# Patient Record
Sex: MALE | Race: WHITE | NOT HISPANIC OR LATINO | Employment: FULL TIME | URBAN - METROPOLITAN AREA
[De-identification: names, ages, dates, MRNs, and addresses within clinical notes are randomized per-mention and may not be internally consistent; named-entity substitution may affect disease eponyms.]

---

## 2017-03-15 NOTE — PROGRESS NOTES
SUBJECTIVE:     CC: Ceferino Crouch is an 51 year old male who presents for preventative health visit.     Healthy Habits:    Do you get at least three servings of calcium containing foods daily (dairy, green leafy vegetables, etc.)? Yes, cut back on     Amount of exercise or daily activities, outside of work: physical active walking dogs    Problems taking medications regularly No    Medication side effects: No    Have you had an eye exam in the past two years? yes    Do you see a dentist twice per year? yes    Do you have sleep apnea, excessive snoring or daytime drowsiness?no    Concerns:  1. Varicose Veins: Patient states he has some swelling in his right lower leg but it has been that way for awhile now.   2. Asthma:  3. Mini stroke after Thanks Giving: ER could not find exact cause after extensive work up. Took an ASA.    Today's PHQ-2 Score:   PHQ-2 ( 1999 Pfizer) 3/21/2017 3/10/2016   Q1: Little interest or pleasure in doing things 0 0   Q2: Feeling down, depressed or hopeless 0 0   PHQ-2 Score 0 0       Abuse: Current or Past(Physical, Sexual or Emotional)- No  Do you feel safe in your environment - Yes    Social History   Substance Use Topics     Smoking status: Never Smoker     Smokeless tobacco: Never Used      Comment: Lives in smoke free household     Alcohol use Yes      Comment: Social drinks occasionally.     The patient does not drink >3 drinks per day nor >7 drinks per week.    Last PSA:   PSA   Date Value Ref Range Status   03/10/2016 0.90 0 - 4 ug/L Final       Recent Labs   Lab Test  03/10/16   0952  02/02/15   0958  05/06/13   1117   CHOL  156  168  196   HDL  59  66  49   LDL  87  91  127   TRIG  51  54  103   CHOLHDLRATIO   --   2.5  4.0   NHDL  97   --    --      Asthma Follow-Up   He was in [facility] 2 weeks ago and was treated for pneumonia and was using the inhaler up to 3 times a day.  At this time back to normal.    Was ACT completed today?    Yes    ACT Total Scores  "3/21/2017   ACT TOTAL SCORE -   ASTHMA ER VISITS -   ASTHMA HOSPITALIZATIONS -   ACT TOTAL SCORE (Goal Greater than or Equal to 20) 11   In the past 12 months, how many times did you visit the emergency room for your asthma without being admitted to the hospital? 0   In the past 12 months, how many times were you hospitalized overnight because of your asthma? 0       Recent asthma triggers that patient is dealing with: upper respiratory infections      Reviewed orders with patient. Reviewed health maintenance and updated orders accordingly - Yes    Reviewed and updated as needed this visit by clinical staff  Tobacco  Allergies  Meds  Med Hx  Surg Hx  Fam Hx  Soc Hx        Reviewed and updated as needed this visit by Provider            ROS:  C: NEGATIVE for fever, chills, change in weight  I: NEGATIVE for worrisome rashes, moles or lesions  E: NEGATIVE for vision changes or irritation  ENT: NEGATIVE for ear, mouth and throat problems  R: NEGATIVE for significant cough or SOB  CV: NEGATIVE for chest pain, palpitations or peripheral edema  GI: NEGATIVE for nausea, abdominal pain, heartburn, or change in bowel habits   male: negative for dysuria, hematuria, decreased urinary stream, erectile dysfunction, urethral discharge  M: NEGATIVE for significant arthralgias or myalgia  N: NEGATIVE for weakness, dizziness or paresthesias  P: NEGATIVE for changes in mood or affect  EXTREMITY: RT Leg Varicose veins    Problem list, Medication list, Allergies, and Medical/Social/Surgical histories reviewed in EPIC and updated as appropriate.  OBJECTIVE:     /70  Pulse 65  Temp 97.1  F (36.2  C) (Oral)  Ht 6' 0.25\" (1.835 m)  Wt 191 lb (86.6 kg)  SpO2 98%  BMI 25.73 kg/m2  EXAM:  GENERAL: healthy, alert and no distress  EYES: Eyes grossly normal to inspection, PERRL and conjunctivae and sclerae normal  HENT: ear canals and TM's normal, nose and mouth without ulcers or lesions  NECK: no adenopathy, no asymmetry, masses, " "or scars and thyroid normal to palpation  RESP: lungs clear to auscultation - no rales, rhonchi or wheezes  CV: regular rate and rhythm, normal S1 S2, no S3 or S4, no murmur, click or rub, no peripheral edema and peripheral pulses strong  ABDOMEN: soft, nontender, no hepatosplenomegaly, no masses and bowel sounds normal  MS: no gross musculoskeletal defects noted, no edema  SKIN: no suspicious lesions or rashes  NEURO: Normal strength and tone, mentation intact and speech normal  PSYCH: mentation appears normal, affect normal/bright    ASSESSMENT/PLAN:     Ceferino was seen today for physical.    Diagnoses and all orders for this visit:    Encounter for routine adult medical exam with abnormal findings  -     Glucose    Varicose veins of leg with pain, right  -     VASCULAR SURGERY REFERRAL    Need for hepatitis C screening test  -     Hepatitis C Screen Reflex to HCV RNA Quant and Genotype    Mild persistent asthma without complication  -     mometasone (ASMANEX 30 METERED DOSES) 220 MCG/INH Inhaler; Inhale 2 puffs into the lungs daily  -     albuterol (PROAIR HFA/PROVENTIL HFA/VENTOLIN HFA) 108 (90 BASE) MCG/ACT Inhaler; Inhale 2 puffs into the lungs every 6 hours as needed for shortness of breath / dyspnea    Hyperlipidemia with target LDL less than 160  -     Lipid panel reflex to direct LDL    Screening for prostate cancer  -     Prostate spec antigen screen    H/O Mini stroke (H)     Resolved     On ASA      COUNSELING:  Reviewed preventive health counseling, as reflected in patient instructions       Regular exercise       Healthy diet/nutrition       Consider Hep C screening for patients born between 1945 and 1965       Prostate cancer screening       reports that he has never smoked. He has never used smokeless tobacco.    Estimated body mass index is 25.73 kg/(m^2) as calculated from the following:    Height as of this encounter: 6' 0.25\" (1.835 m).    Weight as of this encounter: 191 lb (86.6 kg).   Weight " management plan: Discussed healthy diet and exercise guidelines and patient will follow up in 12 months in clinic to re-evaluate.    Counseling Resources:  ATP IV Guidelines  Pooled Cohorts Equation Calculator  FRAX Risk Assessment  ICSI Preventive Guidelines  Dietary Guidelines for Americans, 2010  USDA's MyPlate  ASA Prophylaxis  Lung CA Screening    Shady Ruggiero MD  [facility]

## 2017-03-15 NOTE — PATIENT INSTRUCTIONS
Preventive Health Recommendations  Male Ages 50   64    Yearly exam:             See your health care provider every year in order to  o   Review health changes.   o   Discuss preventive care.    o   Review your medicines if your doctor has prescribed any.     Have a cholesterol test every 5 years, or more frequently if you are at risk for high cholesterol/heart disease.     Have a diabetes test (fasting glucose) every three years. If you are at risk for diabetes, you should have this test more often.     Have a colonoscopy at age 50, or have a yearly FIT test (stool test). These exams will check for colon cancer.      Talk with your health care provider about whether or not a prostate cancer screening test (PSA) is right for you.    You should be tested each year for STDs (sexually transmitted diseases), if you re at risk.     Shots: Get a flu shot each year. Get a tetanus shot every 10 years.     Nutrition:    Eat at least 5 servings of fruits and vegetables daily.     Eat whole-grain bread, whole-wheat pasta and brown rice instead of white grains and rice.     Talk to your provider about Calcium and Vitamin D.     Lifestyle    Exercise for at least 150 minutes a week (30 minutes a day, 5 days a week). This will help you control your weight and prevent disease.     Limit alcohol to one drink per day.     No smoking.     Wear sunscreen to prevent skin cancer.     See your dentist every six months for an exam and cleaning.     See your eye doctor every 1 to 2 years.    [facility]    If you have any questions regarding to your visit please contact your care team:       Team Purple:   Clinic Hours Telephone Number   [address] Dr[address]   7am-7pm  Monday - Thursday   7am-5pm  Fridays  [phone]  (Appointment scheduling available 24/7)    Questions about your Visit?   Team Line:  [phone]   Urgent Care - [place] and [place]  [place] - 11am-9pm Monday-Friday Saturday-Sunday- 9am-5pm   [place] - 5pm-9pm Monday-Friday Saturday-Sunday- 9am-5pm  [phone] - [place]   [phone] - [place]       What options do I have for visits at the clinic other than the traditional office visit?  To expand how we care for you, many of our providers are utilizing electronic visits (e-visits) and telephone visits, when medically appropriate, for interactions with their patients rather than a visit in the clinic.   We also offer nurse visits for many medical concerns. Just like any other service, we will bill your insurance company for this type of visit based on time spent on the phone with your provider. Not all insurance companies cover these visits. Please check with your medical insurance if this type of visit is covered. You will be responsible for any charges that are not paid by your insurance.      E-visits via [employer]:  generally incur a $35.00 fee.  Telephone visits:  Time spent on the phone: *charged based on time that is spent on the phone in increments of 10 minutes. Estimated cost:   5-10 mins $30.00   11-20 mins. $59.00   21-30 mins. $85.00     Use [employer] (secure email communication and access to your chart) to send your primary care provider a message or make an appointment. Ask someone on your Team how to sign up for [employer].  For a Price Quote for your services, please call our Consumer Price Line at [phone].  As always, Thank you for trusting us with your health care needs!    Discharged by Michelle Heredia CMA

## 2017-03-21 ENCOUNTER — OFFICE VISIT (OUTPATIENT)
Dept: FAMILY MEDICINE | Facility: CLINIC | Age: 52
End: 2017-03-21
Payer: COMMERCIAL

## 2017-03-21 VITALS
WEIGHT: 191 LBS | TEMPERATURE: 97.1 F | SYSTOLIC BLOOD PRESSURE: 110 MMHG | BODY MASS INDEX: 25.87 KG/M2 | DIASTOLIC BLOOD PRESSURE: 70 MMHG | OXYGEN SATURATION: 98 % | HEIGHT: 72 IN | HEART RATE: 65 BPM

## 2017-03-21 DIAGNOSIS — I83.811 VARICOSE VEINS OF LEG WITH PAIN, RIGHT: ICD-10-CM

## 2017-03-21 DIAGNOSIS — G45.9 MINI STROKE: ICD-10-CM

## 2017-03-21 DIAGNOSIS — Z11.59 NEED FOR HEPATITIS C SCREENING TEST: ICD-10-CM

## 2017-03-21 DIAGNOSIS — E78.5 HYPERLIPIDEMIA WITH TARGET LDL LESS THAN 160: ICD-10-CM

## 2017-03-21 DIAGNOSIS — Z00.01 ENCOUNTER FOR ROUTINE ADULT MEDICAL EXAM WITH ABNORMAL FINDINGS: Primary | ICD-10-CM

## 2017-03-21 DIAGNOSIS — Z12.5 SCREENING FOR PROSTATE CANCER: ICD-10-CM

## 2017-03-21 DIAGNOSIS — J45.30 MILD PERSISTENT ASTHMA WITHOUT COMPLICATION: ICD-10-CM

## 2017-03-21 LAB
CHOLEST SERPL-MCNC: 167 MG/DL
GLUCOSE SERPL-MCNC: 95 MG/DL (ref 70–99)
HCV AB SERPL QL IA: NORMAL
HDLC SERPL-MCNC: 72 MG/DL
LDLC SERPL CALC-MCNC: 85 MG/DL
NONHDLC SERPL-MCNC: 95 MG/DL
PSA SERPL-ACNC: 0.95 UG/L (ref 0–4)
TRIGL SERPL-MCNC: 52 MG/DL

## 2017-03-21 PROCEDURE — G0103 PSA SCREENING: HCPCS | Performed by: FAMILY MEDICINE

## 2017-03-21 PROCEDURE — 99396 PREV VISIT EST AGE 40-64: CPT | Performed by: FAMILY MEDICINE

## 2017-03-21 PROCEDURE — 80061 LIPID PANEL: CPT | Performed by: FAMILY MEDICINE

## 2017-03-21 PROCEDURE — 86803 HEPATITIS C AB TEST: CPT | Performed by: FAMILY MEDICINE

## 2017-03-21 PROCEDURE — 82947 ASSAY GLUCOSE BLOOD QUANT: CPT | Performed by: FAMILY MEDICINE

## 2017-03-21 PROCEDURE — 36415 COLL VENOUS BLD VENIPUNCTURE: CPT | Performed by: FAMILY MEDICINE

## 2017-03-21 RX ORDER — ALBUTEROL SULFATE 90 UG/1
2 AEROSOL, METERED RESPIRATORY (INHALATION) EVERY 6 HOURS PRN
Qty: 3 INHALER | Refills: 3 | Status: SHIPPED | OUTPATIENT
Start: 2017-03-21 | End: 2018-03-27

## 2017-03-21 ASSESSMENT — PAIN SCALES - GENERAL: PAINLEVEL: NO PAIN (0)

## 2017-03-21 NOTE — LETTER
[address] [place] [address]    March 22, 2017    Ceferino Crouch  [address] [address] [place], [place] [address]LE [facility] [address]          Dear Ceferino,    Enclosed is a copy of your results. Normal results.     Results for orders placed or performed in visit on 03/21/17   Hepatitis C Screen Reflex to HCV RNA Quant and Genotype   Result Value Ref Range    Hepatitis C Antibody  NR     Nonreactive   Assay performance characteristics have not been established for newborns,   infants, and children     Lipid panel reflex to direct LDL   Result Value Ref Range    Cholesterol 167 <200 mg/dL    Triglycerides 52 <150 mg/dL    HDL Cholesterol 72 >39 mg/dL    LDL Cholesterol Calculated 85 <100 mg/dL    Non HDL Cholesterol 95 <130 mg/dL   Glucose   Result Value Ref Range    Glucose 95 70 - 99 mg/dL   Prostate spec antigen screen   Result Value Ref Range    PSA 0.95 0 - 4 ug/L       If you have any questions or concerns, please me or my clinic team at [phone].      Sincerely,        Shady Ruggiero MD/bt

## 2017-03-21 NOTE — MR AVS SNAPSHOT
After Visit Summary   3/21/2017    Ceferino Crouch    MRN: [id number]           Patient Information     Date Of Birth          1965        Visit Information        Provider Department      3/21/2017 9:20 AM Shady Ruggiero MD [facility]        Today's Diagnoses     Encounter for routine adult medical exam with abnormal findings    -  1    Varicose veins of leg with pain, right        Need for hepatitis C screening test        Mild persistent asthma without complication        Hyperlipidemia with target LDL less than 160        Screening for prostate cancer          Care Instructions      Preventive Health Recommendations  Male Ages 50 - 64    Yearly exam:             See your health care provider every year in order to  o   Review health changes.   o   Discuss preventive care.    o   Review your medicines if your doctor has prescribed any.     Have a cholesterol test every 5 years, or more frequently if you are at risk for high cholesterol/heart disease.     Have a diabetes test (fasting glucose) every three years. If you are at risk for diabetes, you should have this test more often.     Have a colonoscopy at age 50, or have a yearly FIT test (stool test). These exams will check for colon cancer.      Talk with your health care provider about whether or not a prostate cancer screening test (PSA) is right for you.    You should be tested each year for STDs (sexually transmitted diseases), if you re at risk.     Shots: Get a flu shot each year. Get a tetanus shot every 10 years.     Nutrition:    Eat at least 5 servings of fruits and vegetables daily.     Eat whole-grain bread, whole-wheat pasta and brown rice instead of white grains and rice.     Talk to your provider about Calcium and Vitamin D.     Lifestyle    Exercise for at least 150 minutes a week (30 minutes a day, 5 days a week). This will help you control your weight and prevent disease.     Limit alcohol to one  drink per day.     No smoking.     Wear sunscreen to prevent skin cancer.     See your dentist every six months for an exam and cleaning.     See your eye doctor every 1 to 2 years.    [facility]    If you have any questions regarding to your visit please contact your care team:       Team Purple:   Clinic Hours Telephone Number   [address] Dr.   7am-7pm  Monday - Thursday   7am-5pm  Fridays  [phone]  (Appointment scheduling available 24/7)    Questions about your Visit?   Team Line:  [phone]   Urgent Care - [place] and [place] - 11am-9pm Monday-Friday Saturday-Sunday- 9am-5pm   [place] - 5pm-9pm Monday-Friday Saturday-Sunday- 9am-5pm  [phone] - [place]   [phone] - [place]       What options do I have for visits at the clinic other than the traditional office visit?  To expand how we care for you, many of our providers are utilizing electronic visits (e-visits) and telephone visits, when medically appropriate, for interactions with their patients rather than a visit in the clinic.   We also offer nurse visits for many medical concerns. Just like any other service, we will bill your insurance company for this type of visit based on time spent on the phone with your provider. Not all insurance companies cover these visits. Please check with your medical insurance if this type of visit is covered. You will be responsible for any charges that are not paid by your insurance.      E-visits via [employer]:  generally incur a $35.00 fee.  Telephone visits:  Time spent on the phone: *charged based on time that is spent on the phone in increments of 10 minutes. Estimated cost:   5-10 mins $30.00   11-20 mins. $59.00   21-30 mins. $85.00     Use [employer] (secure email communication and access to your chart) to send your primary care provider a message or make an appointment. Ask someone on your Team how to  sign up for [employer].  For a Price Quote for your services, please call our Consumer Price Line at [phone].  As always, Thank you for trusting us with your health care needs!    Discharged by Michelle Heredia CMA          Follow-ups after your visit        Additional Services     VASCULAR SURGERY REFERRAL       Your provider has referred you to: **[facility] [phone] - Varicose Veins & None - Please Order Appropriate Testing   [email or web address]    Please be aware that coverage of these services is subject to the terms and limitations of your health insurance plan.  Call member services at your health plan with any benefit or coverage questions.      Please bring the following with you to your appointment:    (1) Any X-Rays, CTs or MRIs which have been performed.  Contact the facility where they were done to arrange for  prior to your scheduled appointment.    (2) List of current medications   (3) This referral request   (4) Any documents/labs given to you for this referral                  Who to contact     If you have questions or need follow up information about today's clinic visit or your schedule please contact [facility] directly at [phone].  Normal or non-critical lab and imaging results will be communicated to you by MyChart, letter or phone within 4 business days after the clinic has received the results. If you do not hear from us within 7 days, please contact the clinic through MyChart or phone. If you have a critical or abnormal lab result, we will notify you by phone as soon as possible.  Submit refill requests through [employer] or call your pharmacy and they will forward the refill request to us. Please allow 3 business days for your refill to be completed.          Additional Information About Your Visit        [employer] Information     [employer] lets you send messages to your doctor, view your test results, renew your  "prescriptions, schedule appointments and more. To sign up, go to [email or web address] . Click on \"Log in\" on the left side of the screen, which will take you to the Welcome page. Then click on \"Sign up Now\" on the right side of the page.     You will be asked to enter the access code listed below, as well as some personal information. Please follow the directions to create your username and password.     Your access code is: [email or web address]  Expires: 2017  9:53 AM     Your access code will  in 90 days. If you need help or a new code, please call your [facility] clinic or [phone].        Care EveryWhere ID     This is your Care EveryWhere ID. This could be used by other organizations to access your [facility] medical records  [id number]        Your Vitals Were     Pulse Temperature Height Pulse Oximetry BMI (Body Mass Index)       65 97.1  F (36.2  C) (Oral) 6' 0.25\" (1.835 m) 98% 25.73 kg/m2        Blood Pressure from Last 3 Encounters:   17 110/70   16 102/68   03/10/16 110/60    Weight from Last 3 Encounters:   17 191 lb (86.6 kg)   16 212 lb (96.2 kg)   03/10/16 202 lb 3.2 oz (91.7 kg)              We Performed the Following     Asthma Action Plan   (Please complete E-AAP by signing order and opening link in order details)     Glucose     Hepatitis C Screen Reflex to HCV RNA Quant and Genotype     Lipid panel reflex to direct LDL     Prostate spec antigen screen     VASCULAR SURGERY REFERRAL          Where to get your medicines      Some of these will need a paper prescription and others can be bought over the counter.  Ask your nurse if you have questions.     Bring a paper prescription for each of these medications     albuterol 108 (90 BASE) MCG/ACT Inhaler    mometasone 220 MCG/INH Inhaler          Primary Care Provider Office Phone # Fax #    Felipe Pollock PA-C [phone]       [address] [address]      "   Thank you!     Thank you for choosing [facility]  for your care. Our goal is always to provide you with excellent care. Hearing back from our patients is one way we can continue to improve our services. Please take a few minutes to complete the written survey that you may receive in the mail after your visit with us. Thank you!             Your Updated Medication List - Protect others around you: Learn how to safely use, store and throw away your medicines at www.disposemymeds.org.          This list is accurate as of: 3/21/17  9:53 AM.  Always use your most recent med list.                   Brand Name Dispense Instructions for use    albuterol 108 (90 BASE) MCG/ACT Inhaler    PROAIR HFA/PROVENTIL HFA/VENTOLIN HFA    3 Inhaler    Inhale 2 puffs into the lungs every 6 hours as needed for shortness of breath / dyspnea       mometasone 220 MCG/INH Inhaler    ASMANEX 30 METERED DOSES    2 Inhaler    Inhale 2 puffs into the lungs daily

## 2017-03-21 NOTE — NURSING NOTE
"Chief Complaint   Patient presents with     Physical       Initial /70  Pulse 65  Temp 97.1  F (36.2  C) (Oral)  Ht 6' 0.25\" (1.835 m)  Wt 191 lb (86.6 kg)  SpO2 98%  BMI 25.73 kg/m2 Estimated body mass index is 25.73 kg/(m^2) as calculated from the following:    Height as of this encounter: 6' 0.25\" (1.835 m).    Weight as of this encounter: 191 lb (86.6 kg).  Medication Reconciliation: complete     Michelle Heredia CMA    "

## 2017-03-22 ASSESSMENT — ASTHMA QUESTIONNAIRES: ACT_TOTALSCORE: 11

## 2017-03-31 ENCOUNTER — TELEPHONE (OUTPATIENT)
Dept: FAMILY MEDICINE | Facility: CLINIC | Age: 52
End: 2017-03-31

## 2017-03-31 NOTE — LETTER
[facility]  [address] [place] [id number]  [phone]    April 12, 2017      Ceferino Crouch  [address] [address] [place] [address]      Dear Ceferino,     Your clinic record indicates that you are due for an asthma update. We have a survey tool called an ACT (or Asthma Control Test) we use to measure the level of control of your asthma. Please complete the enclosed questionnaire and mail it back to us in the self-addressed stamped envelope.     If you have questions about this letter please contact your provider.     Sincerely,      Your [facility]

## 2017-03-31 NOTE — TELEPHONE ENCOUNTER
Patient was in to see Monik on 03-21-17 and failed their ACT by scoring 11. Please put in the failed ACT workflow for follow-up. Thank you.

## 2017-04-17 ENCOUNTER — TELEPHONE (OUTPATIENT)
Dept: FAMILY MEDICINE | Facility: CLINIC | Age: 52
End: 2017-04-17

## 2017-04-17 DIAGNOSIS — J45.30 MILD PERSISTENT ASTHMA WITHOUT COMPLICATION: Primary | ICD-10-CM

## 2017-04-17 DIAGNOSIS — J45.30 ASTHMA, MILD PERSISTENT: ICD-10-CM

## 2017-04-17 NOTE — TELEPHONE ENCOUNTER
Patient requesting a call back to discuss dosage for inhaler as he thought the dosage was different.    Please advise.    Thank you.    Danielle RAYO

## 2017-04-17 NOTE — TELEPHONE ENCOUNTER
Patient notified of providers message as written.   Patient verbalized understanding and has no further questions or concerns.    Yasmin Garcia RN - BC

## 2017-04-17 NOTE — TELEPHONE ENCOUNTER
Spoke with patient his Asmanex was ordered for 2 puffs daily but it was ordered for the 30 dose not the 60, so patient only received a 15 day supply in 1 inhaler. Patient would like this changed to the 60 dose inhaler.  Patient would like call once prescription is sent in.   Order pending in   Please advise   Jodee Owen RN

## 2017-04-25 NOTE — TELEPHONE ENCOUNTER
Left a message for Ceferino to in regarding to the letter that was sent a few weeks ago with a test enclosed. Asked if he would please fill out and send back the test in the pre-paid envelop. Asked him to call the clinic with questions. Manju Rodriguez,

## 2017-04-27 ENCOUNTER — APPOINTMENT (OUTPATIENT)
Dept: VASCULAR SURGERY | Facility: CLINIC | Age: 52
End: 2017-04-27
Payer: COMMERCIAL

## 2017-04-27 PROCEDURE — 99207 ZZC VEINSOLUTIONS FREE SCREENING: CPT | Performed by: SURGERY

## 2017-05-02 DIAGNOSIS — E78.5 HYPERLIPIDEMIA WITH TARGET LDL LESS THAN 160: Primary | ICD-10-CM

## 2017-08-08 ENCOUNTER — TELEPHONE (OUTPATIENT)
Dept: FAMILY MEDICINE | Facility: CLINIC | Age: 52
End: 2017-08-08

## 2017-08-08 NOTE — TELEPHONE ENCOUNTER
Panel Management Review      Patient has the following on his problem list:     Asthma review     ACT Total Scores 3/21/2017   ACT TOTAL SCORE -   ASTHMA ER VISITS -   ASTHMA HOSPITALIZATIONS -   ACT TOTAL SCORE (Goal Greater than or Equal to 20) 11   In the past 12 months, how many times did you visit the emergency room for your asthma without being admitted to the hospital? 0   In the past 12 months, how many times were you hospitalized overnight because of your asthma? 0      1. Is Asthma diagnosis on the Problem List? Yes    2. Is Asthma listed on Health Maintenance? Yes    3. Patient is due for:  Failing ACT      IVD   ASA: Passed    Last LDL:    Lab Results   Component Value Date    CHOL 167 03/21/2017     Lab Results   Component Value Date    HDL 72 03/21/2017     Lab Results   Component Value Date    LDL 85 03/21/2017     Lab Results   Component Value Date    TRIG 52 03/21/2017        Lab Results   Component Value Date    CHOLHDLRATIO 2.5 02/02/2015        Is the patient on a Statin? NO   Is the patient on Aspirin? YES                  Medications     Salicylates    aspirin 81 MG tablet          Last three blood pressure readings:  BP Readings from Last 3 Encounters:   03/21/17 110/70   05/12/16 102/68   03/10/16 110/60        Tobacco History:     History   Smoking Status     Never Smoker   Smokeless Tobacco     Never Used     Comment: Lives in smoke free household             Composite cancer screening  Chart review shows that this patient is due/due soon for the following None  Summary:    Patient is due/failing the following:   Failing ACT and STATIN    Action needed:   Routed to provider for review., Patient needs to do ACT    Type of outreach:    Copy of ACT mailed to patient, will reach out in 5 days.    Questions for provider review:    Patient is not on a statin for IVD                                                                                                                                      An SCHUYLER [place]       Chart routed to Care Team .

## 2017-08-08 NOTE — LETTER
August 8, 2017      Ceferino Crouch  [address]  [facility] [address]      Dear Ceferino,     Your clinic record indicates that you are due for an asthma update. We have a survey tool called an ACT (or Asthma Control Test) we use to measure the level of control of your asthma. Please complete the enclosed questionnaire and mail it back to us in the self-addressed stamped envelope.     If you have questions about this letter please contact your provider.     Sincerely,    Your [facility]

## 2017-12-12 ENCOUNTER — TELEPHONE (OUTPATIENT)
Dept: FAMILY MEDICINE | Facility: CLINIC | Age: 52
End: 2017-12-12

## 2017-12-12 NOTE — TELEPHONE ENCOUNTER
Panel Management Review      Patient has the following on his problem list:     Asthma review     ACT Total Scores 3/21/2017   ACT TOTAL SCORE -   ASTHMA ER VISITS -   ASTHMA HOSPITALIZATIONS -   ACT TOTAL SCORE (Goal Greater than or Equal to 20) 11   In the past 12 months, how many times did you visit the emergency room for your asthma without being admitted to the hospital? 0   In the past 12 months, how many times were you hospitalized overnight because of your asthma? 0      1. Is Asthma diagnosis on the Problem List? Yes    2. Is Asthma listed on Health Maintenance? Yes    3. Patient is due for:  ACT      IVD   ASA: Passed    Last LDL:    Lab Results   Component Value Date    CHOL 167 03/21/2017     Lab Results   Component Value Date    HDL 72 03/21/2017     Lab Results   Component Value Date    LDL 85 03/21/2017     Lab Results   Component Value Date    TRIG 52 03/21/2017        Lab Results   Component Value Date    CHOLHDLRATIO 2.5 02/02/2015        Is the patient on a Statin? NO   Is the patient on Aspirin? YES                  Medications     Salicylates    aspirin 81 MG tablet          Last three blood pressure readings:  BP Readings from Last 3 Encounters:   03/21/17 110/70   05/12/16 102/68   03/10/16 110/60        Tobacco History:     History   Smoking Status     Never Smoker   Smokeless Tobacco     Never Used     Comment: Lives in smoke free household             Composite cancer screening  Chart review shows that this patient is due/due soon for the following None  Summary:    Patient is due/failing the following:   ACT and STATIN    Action needed:   Patient needs office visit for ACT and appointment for follow up asthma and IVD.    Type of outreach:    Routed to the team, Please reach out to Ceferino and get him scheduled for a follow up. Thank you    Questions for provider review:    None                                                                                                                                     LEONIDAS Grey       Chart routed to Care Team .

## 2018-01-25 ENCOUNTER — TELEPHONE (OUTPATIENT)
Dept: FAMILY MEDICINE | Facility: CLINIC | Age: 53
End: 2018-01-25

## 2018-01-25 DIAGNOSIS — J45.30 MILD PERSISTENT ASTHMA WITHOUT COMPLICATION: Primary | ICD-10-CM

## 2018-01-25 NOTE — LETTER
[facility]  [address] [place] [address]  [place] [address]  [phone]    January 26, 2018      Ceferino Crouch  [address] [facility] [address]      Dear Ceferino,     Your clinic record indicates that you are due for an asthma update. We have a survey tool called an ACT (or Asthma Control Test) we use to measure the level of control of your asthma. Please complete the enclosed questionnaire and mail it back to us in the self-addressed stamped envelope.     If you have questions about this letter please contact your provider.     Sincerely,    Your [facility]

## 2018-01-25 NOTE — TELEPHONE ENCOUNTER
Reason for Call:  Other prescription    Detailed comments: Patient states insurance does not cover Asmanex medication. He is requesting a refill authorization. [facility] pharmacy did send a fax as well. Patient only has enough for two more days.     Pharmacy: [facility]     Phone Number Patient can be reached at: Home number on file [phone] (home)    Best Time: any    Can we leave a detailed message on this number? YES    Call taken on 1/25/2018 at 9:22 AM by Mundo Gaytan

## 2018-01-25 NOTE — TELEPHONE ENCOUNTER
Received fax from pharmacy stating patient requires Prior Authorization for Asmanex     Insurance information:  Name: Hema   Phone number: [phone]   ID number: [id number]    Provider to address. Message route to Dr. Ruggiero. Initiate prior authorization or change medication?  Please advise.  Kira SALCEDO MA      **If a prior authorization is to be initiated, please list the following:    -Any medications the patient has tried and failed or any contraindications. **    -Is the patient currently on this medication, or has tried before? **    -What is the diagnosis? **    - Justification or other information that me by helpful. **      Called and spoke with patient. Informed patient of Asmanex medication will need PA or switch to a different medication. Patient is willing to switch to a different medication, if Dr. Ruggiero prefers.   Kira SALCEDO MA

## 2018-01-26 RX ORDER — FLUTICASONE PROPIONATE 220 UG/1
2 AEROSOL, METERED RESPIRATORY (INHALATION) 2 TIMES DAILY
Qty: 1 INHALER | Refills: 1 | Status: SHIPPED | OUTPATIENT
Start: 2018-01-26 | End: 2018-03-27

## 2018-01-26 NOTE — TELEPHONE ENCOUNTER
Called patient and advised him that a new medication was sent to the pharmacy. I also will be sending him an ACT and letter and he said he will send it back to us.     Lexy Thornton MA

## 2018-02-09 ASSESSMENT — ASTHMA QUESTIONNAIRES: ACT_TOTALSCORE: 20

## 2018-03-20 NOTE — PATIENT INSTRUCTIONS
Preventive Health Recommendations  Male Ages 50   64    Yearly exam:             See your health care provider every year in order to  o   Review health changes.   o   Discuss preventive care.    o   Review your medicines if your doctor has prescribed any.     Have a cholesterol test every 5 years, or more frequently if you are at risk for high cholesterol/heart disease.     Have a diabetes test (fasting glucose) every three years. If you are at risk for diabetes, you should have this test more often.     Have a colonoscopy at age 50, or have a yearly FIT test (stool test). These exams will check for colon cancer.      Talk with your health care provider about whether or not a prostate cancer screening test (PSA) is right for you.    You should be tested each year for STDs (sexually transmitted diseases), if you re at risk.     Shots: Get a flu shot each year. Get a tetanus shot every 10 years.     Nutrition:    Eat at least 5 servings of fruits and vegetables daily.     Eat whole-grain bread, whole-wheat pasta and brown rice instead of white grains and rice.     Talk to your provider about Calcium and Vitamin D.     Lifestyle    Exercise for at least 150 minutes a week (30 minutes a day, 5 days a week). This will help you control your weight and prevent disease.     Limit alcohol to one drink per day.     No smoking.     Wear sunscreen to prevent skin cancer.     See your dentist every six months for an exam and cleaning.     See your eye doctor every 1 to 2 years.  [facility]    If you have any questions regarding to your visit please contact your care team:       Team Purple:   Clinic Hours Telephone Number   Dr. Ann Marie Cagle   7am-7pm  Monday - Thursday   7am-5pm  Fridays  [phone]  (Appointment scheduling available 24/7)    Questions about your Visit?   Team Line:  [phone]   Urgent Care - [place] and [place]  [place] - 11am-9pm Monday-Friday Saturday-Sunday- 9am-5pm   [place] - 5pm-9pm Monday-Friday Saturday-Sunday- 9am-5pm  [phone] - [place]   [phone] - [place]       What options do I have for visits at the clinic other than the traditional office visit?  To expand how we care for you, many of our providers are utilizing electronic visits (e-visits) and telephone visits, when medically appropriate, for interactions with their patients rather than a visit in the clinic.   We also offer nurse visits for many medical concerns. Just like any other service, we will bill your insurance company for this type of visit based on time spent on the phone with your provider. Not all insurance companies cover these visits. Please check with your medical insurance if this type of visit is covered. You will be responsible for any charges that are not paid by your insurance.      E-visits via Mobbles:  generally incur a $35.00 fee.  Telephone visits:  Time spent on the phone: *charged based on time that is spent on the phone in increments of 10 minutes. Estimated cost:   5-10 mins $30.00   11-20 mins. $59.00   21-30 mins. $85.00     Use "[employer]"t (secure email communication and access to your chart) to send your primary care provider a message or make an appointment. Ask someone on your Team how to sign up for Mobbles.  For a Price Quote for your services, please call our Consumer Price Line at [phone].  As always, Thank you for trusting us with your health care needs!      Kira SALCEDO MA

## 2018-03-27 ENCOUNTER — OFFICE VISIT (OUTPATIENT)
Dept: FAMILY MEDICINE | Facility: CLINIC | Age: 53
End: 2018-03-27
Payer: COMMERCIAL

## 2018-03-27 VITALS
DIASTOLIC BLOOD PRESSURE: 68 MMHG | SYSTOLIC BLOOD PRESSURE: 104 MMHG | HEART RATE: 64 BPM | TEMPERATURE: 96.6 F | BODY MASS INDEX: 26.61 KG/M2 | HEIGHT: 72 IN | WEIGHT: 196.5 LBS | RESPIRATION RATE: 14 BRPM | OXYGEN SATURATION: 95 %

## 2018-03-27 DIAGNOSIS — J45.30 MILD PERSISTENT ASTHMA WITHOUT COMPLICATION: ICD-10-CM

## 2018-03-27 DIAGNOSIS — E78.5 HYPERLIPIDEMIA LDL GOAL <100: ICD-10-CM

## 2018-03-27 DIAGNOSIS — Z00.00 ROUTINE HISTORY AND PHYSICAL EXAMINATION OF ADULT: Primary | ICD-10-CM

## 2018-03-27 LAB
ANION GAP SERPL CALCULATED.3IONS-SCNC: 9 MMOL/L (ref 3–14)
BUN SERPL-MCNC: 14 MG/DL (ref 7–30)
CALCIUM SERPL-MCNC: 9.2 MG/DL (ref 8.5–10.1)
CHLORIDE SERPL-SCNC: 107 MMOL/L (ref 94–109)
CHOLEST SERPL-MCNC: 178 MG/DL
CO2 SERPL-SCNC: 24 MMOL/L (ref 20–32)
CREAT SERPL-MCNC: 0.9 MG/DL (ref 0.66–1.25)
GFR SERPL CREATININE-BSD FRML MDRD: 89 ML/MIN/1.7M2
GLUCOSE SERPL-MCNC: 91 MG/DL (ref 70–99)
HDLC SERPL-MCNC: 72 MG/DL
LDLC SERPL CALC-MCNC: 94 MG/DL
NONHDLC SERPL-MCNC: 106 MG/DL
POTASSIUM SERPL-SCNC: 4.7 MMOL/L (ref 3.4–5.3)
SODIUM SERPL-SCNC: 140 MMOL/L (ref 133–144)
TRIGL SERPL-MCNC: 61 MG/DL

## 2018-03-27 PROCEDURE — 99396 PREV VISIT EST AGE 40-64: CPT | Performed by: FAMILY MEDICINE

## 2018-03-27 PROCEDURE — 80048 BASIC METABOLIC PNL TOTAL CA: CPT | Performed by: FAMILY MEDICINE

## 2018-03-27 PROCEDURE — 36415 COLL VENOUS BLD VENIPUNCTURE: CPT | Performed by: FAMILY MEDICINE

## 2018-03-27 PROCEDURE — 80061 LIPID PANEL: CPT | Performed by: FAMILY MEDICINE

## 2018-03-27 RX ORDER — ALBUTEROL SULFATE 90 UG/1
2 AEROSOL, METERED RESPIRATORY (INHALATION) EVERY 6 HOURS PRN
Qty: 1 INHALER | Refills: 3 | Status: SHIPPED | OUTPATIENT
Start: 2018-03-27 | End: 2020-12-28

## 2018-03-27 RX ORDER — FLUTICASONE PROPIONATE 220 UG/1
2 AEROSOL, METERED RESPIRATORY (INHALATION) 2 TIMES DAILY
Qty: 3 INHALER | Refills: 1 | Status: SHIPPED | OUTPATIENT
Start: 2018-03-27 | End: 2019-04-26

## 2018-03-27 ASSESSMENT — PAIN SCALES - GENERAL: PAINLEVEL: NO PAIN (0)

## 2018-03-27 NOTE — NURSING NOTE
"Chief Complaint   Patient presents with     Physical     Health Maintenance     Lipid, AAP, Eye Exam        Initial /68  Pulse 64  Temp 96.6  F (35.9  C) (Oral)  Resp 14  Ht 6' 0.24\" (1.835 m)  Wt 196 lb 8 oz (89.1 kg)  SpO2 95%  BMI 26.47 kg/m2 Estimated body mass index is 26.47 kg/(m^2) as calculated from the following:    Height as of this encounter: 6' 0.24\" (1.835 m).    Weight as of this encounter: 196 lb 8 oz (89.1 kg).  Medication Reconciliation: complete   Kira SALCEDO MA    "

## 2018-03-27 NOTE — LETTER
[facility]  [address] [place] [address]    April 2, 2018    Ceferino Crouch  [address] [place] [address]          Dear Ceferino,    Enclosed is a copy of your results. Normal results.    Results for orders placed or performed in visit on 03/27/18   Lipid panel reflex to direct LDL Fasting   Result Value Ref Range    Cholesterol 178 <200 mg/dL    Triglycerides 61 <150 mg/dL    HDL Cholesterol 72 >39 mg/dL    LDL Cholesterol Calculated 94 <100 mg/dL    Non HDL Cholesterol 106 <130 mg/dL   Basic metabolic panel   Result Value Ref Range    Sodium 140 133 - 144 mmol/L    Potassium 4.7 3.4 - 5.3 mmol/L    Chloride 107 94 - 109 mmol/L    Carbon Dioxide 24 20 - 32 mmol/L    Anion Gap 9 3 - 14 mmol/L    Glucose 91 70 - 99 mg/dL    Urea Nitrogen 14 7 - 30 mg/dL    Creatinine 0.90 0.66 - 1.25 mg/dL    GFR Estimate 89 >60 mL/min/1.7m2    GFR Estimate If Black >90 >60 mL/min/1.7m2    Calcium 9.2 8.5 - 10.1 mg/dL       If you have any questions or concerns, please me or my clinic team at [phone].      Sincerely,        Shady Ruggiero MD/bt

## 2018-03-27 NOTE — MR AVS SNAPSHOT
After Visit Summary   3/27/2018    Ceferino Crouch    MRN: [id number]           Patient Information     Date Of Birth          1965        Visit Information        Provider Department      3/27/2018 11:00 AM Shady Ruggiero MD [facility]        Today's Diagnoses     Routine history and physical examination of adult    -  1    Hyperlipidemia LDL goal <100        Mild persistent asthma without complication        Primary osteoarthritis of both feet          Care Instructions      Preventive Health Recommendations  Male Ages 50 - 64    Yearly exam:             See your health care provider every year in order to  o   Review health changes.   o   Discuss preventive care.    o   Review your medicines if your doctor has prescribed any.     Have a cholesterol test every 5 years, or more frequently if you are at risk for high cholesterol/heart disease.     Have a diabetes test (fasting glucose) every three years. If you are at risk for diabetes, you should have this test more often.     Have a colonoscopy at age 50, or have a yearly FIT test (stool test). These exams will check for colon cancer.      Talk with your health care provider about whether or not a prostate cancer screening test (PSA) is right for you.    You should be tested each year for STDs (sexually transmitted diseases), if you re at risk.     Shots: Get a flu shot each year. Get a tetanus shot every 10 years.     Nutrition:    Eat at least 5 servings of fruits and vegetables daily.     Eat whole-grain bread, whole-wheat pasta and brown rice instead of white grains and rice.     Talk to your provider about Calcium and Vitamin D.     Lifestyle    Exercise for at least 150 minutes a week (30 minutes a day, 5 days a week). This will help you control your weight and prevent disease.     Limit alcohol to one drink per day.     No smoking.     Wear sunscreen to prevent skin cancer.     See your dentist every six months for  an exam and cleaning.     See your eye doctor every 1 to 2 years.  [facility]    If you have any questions regarding to your visit please contact your care team:       Team Purple:   Clinic Hours Telephone Number   Dr. Ann Marie Cagle   7am-7pm  Monday - Thursday   7am-5pm  Fridays  [phone]  (Appointment scheduling available 24/7)    Questions about your Visit?   Team Line:  [phone]   Urgent Care - [place] and [place] - 11am-9pm Monday-Friday Saturday-Sunday- 9am-5pm   [place] - 5pm-9pm Monday-Friday Saturday-Sunday- 9am-5pm  [phone] - [facility]  [phone] - [place]       What options do I have for visits at the clinic other than the traditional office visit?  To expand how we care for you, many of our providers are utilizing electronic visits (e-visits) and telephone visits, when medically appropriate, for interactions with their patients rather than a visit in the clinic.   We also offer nurse visits for many medical concerns. Just like any other service, we will bill your insurance company for this type of visit based on time spent on the phone with your provider. Not all insurance companies cover these visits. Please check with your medical insurance if this type of visit is covered. You will be responsible for any charges that are not paid by your insurance.      E-visits via GenePeeks:  generally incur a $35.00 fee.  Telephone visits:  Time spent on the phone: *charged based on time that is spent on the phone in increments of 10 minutes. Estimated cost:   5-10 mins $30.00   11-20 mins. $59.00   21-30 mins. $85.00     Use [employer] (secure email communication and access to your chart) to send your primary care provider a message or make an appointment. Ask someone on your Team how to sign up for [employer]ee[employer].  For a Price Quote for your services, please call our Consumer Price Line at [phone].  As  "always, Thank you for trusting us with your health care needs!      Kira SALCEDO MA            Follow-ups after your visit        Who to contact     If you have questions or need follow up information about today's clinic visit or your schedule please contact [facility] [place] directly at [phone].  Normal or non-critical lab and imaging results will be communicated to you by MyChart, letter or phone within 4 business days after the clinic has received the results. If you do not hear from us within 7 days, please contact the clinic through Teachernowhart or phone. If you have a critical or abnormal lab result, we will notify you by phone as soon as possible.  Submit refill requests through [email or web address] or call your pharmacy and they will forward the refill request to us. Please allow 3 business days for your refill to be completed.          Additional Information About Your Visit        MyChart Information     [email or web address] lets you send messages to your doctor, view your test results, renew your prescriptions, schedule appointments and more. To sign up, go to [email or web address] . Click on \"Log in\" on the left side of the screen, which will take you to the Welcome page. Then click on \"Sign up Now\" on the right side of the page.     You will be asked to enter the access code listed below, as well as some personal information. Please follow the directions to create your username and password.     Your access code is: [email or web address]  Expires: 2018 11:32 AM     Your access code will  in 90 days. If you need help or a new code, please call your [facility] clinic or [phone].        Care EveryWhere ID     This is your Care EveryWhere ID. This could be used by other organizations to access your [facility] medical records  [id number]        Your Vitals Were     Pulse Temperature Respirations Height Pulse Oximetry BMI (Body Mass Index)    64 96.6  F (35.9  C) (Oral) 14 6' 0.24\" (1.835 m) 95% 26.47 kg/m2       Blood " Pressure from Last 3 Encounters:   03/27/18 104/68   03/21/17 110/70   05/12/16 102/68    Weight from Last 3 Encounters:   03/27/18 196 lb 8 oz (89.1 kg)   03/21/17 191 lb (86.6 kg)   05/05/16 212 lb (96.2 kg)              We Performed the Following     Basic metabolic panel     Lipid panel reflex to direct LDL Fasting          Where to get your medicines      These medications were sent to [id number] - [place], [place] - [address]  [address] [place] [address]     Phone:  [phone]     albuterol 108 (90 BASE) MCG/ACT Inhaler    fluticasone 220 MCG/ACT Inhaler          Primary Care Provider Office Phone # Fax #    Shady Ruggiero [phone]       [address]  [place] [address]        Equal Access to Services     [facility] AH: Hadii aad ku hadasho Soomaali, waaxda luqadaha, qaybta kaalmada adeegyada, kaiser vallejoin heri moyer . So [facility] [phone].    ATENCIÓN: Si habla español, tiene a cedillo disposición servicios gratuitos de asistencia lingüística. Llame al [phone].    We comply with applicable federal civil rights laws and [place] laws. We do not discriminate on the basis of race, color, national origin, age, disability, sex, sexual orientation, or gender identity.            Thank you!     Thank you for choosing [facility]  for your care. Our goal is always to provide you with excellent care. Hearing back from our patients is one way we can continue to improve our services. Please take a few minutes to complete the written survey that you may receive in the mail after your visit with us. Thank you!             Your Updated Medication List - Protect others around you: Learn how to safely use, store and throw away your medicines at www.disposemymeds.org.          This list is accurate as of 3/27/18 11:33 AM.  Always use your most recent med list.                   Brand Name Dispense  Instructions for use Diagnosis    albuterol 108 (90 BASE) MCG/ACT Inhaler    PROAIR HFA/PROVENTIL HFA/VENTOLIN HFA    1 Inhaler    Inhale 2 puffs into the lungs every 6 hours as needed for shortness of breath / dyspnea    Mild persistent asthma without complication       aspirin 81 MG tablet      Take 1 tablet (81 mg) by mouth daily        fluticasone 220 MCG/ACT Inhaler    FLOVENT HFA    3 Inhaler    Inhale 2 puffs into the lungs 2 times daily    Mild persistent asthma without complication

## 2018-03-27 NOTE — PROGRESS NOTES
SUBJECTIVE:   CC: Ceferino Crouch is an 52 year old male who presents for preventative health visit.     Physical   Annual:     Getting at least 3 servings of Calcium per day::  Yes    Bi-annual eye exam::  Yes    Dental care twice a year::  Yes    Sleep apnea or symptoms of sleep apnea::  None    Taking medications regularly::  Yes    Medication side effects::  None    Additional concerns today::  No            Asthma: Taking Flovent and is working well.    ACT Total Scores 3/10/2016 3/21/2017 2/8/2018   ACT TOTAL SCORE - - -   ASTHMA ER VISITS - - -   ASTHMA HOSPITALIZATIONS - - -   ACT TOTAL SCORE (Goal Greater than or Equal to 20) 16 11 20   In the past 12 months, how many times did you visit the emergency room for your asthma without being admitted to the hospital? 0 0 0   In the past 12 months, how many times were you hospitalized overnight because of your asthma? 0 0 0     PROBLEMS TO ADD ON...    Today's PHQ-2 Score:   PHQ-2 ( 1999 [employer]) 3/27/2018   Q1: Little interest or pleasure in doing things 0   Q2: Feeling down, depressed or hopeless 0   PHQ-2 Score 0   Q1: Little interest or pleasure in doing things Not at all   Q2: Feeling down, depressed or hopeless Not at all   PHQ-2 Score 0     Abuse: Current or Past(Physical, Sexual or Emotional)- No  Do you feel safe in your environment - Yes    Social History   Substance Use Topics     Smoking status: Never Smoker     Smokeless tobacco: Never Used      Comment: Lives in smoke free household     Alcohol use Yes      Comment: Social drinks occasionally.     Alcohol Use 3/27/2018   If you drink alcohol do you typically have greater than 3 drinks per day OR greater than 7 drinks per week? No   No flowsheet data found.    Last PSA:   PSA   Date Value Ref Range Status   03/21/2017 0.95 0 - 4 ug/L Final     Comment:     Assay Method:  Chemiluminescence using Siemens Vista analyzer       Reviewed orders with patient. 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     DJD (degenerative joint disease)     Mini stroke (H)     Varicose veins of leg with pain, right     Past Surgical History:   Procedure Laterality Date     COLONOSCOPY N/A 5/12/2016    Procedure: COLONOSCOPY;  Surgeon: Shady Miguel MD;  Location: [facility] OR     COLONOSCOPY WITH CO2 INSUFFLATION N/A 5/12/2016    Procedure: COLONOSCOPY WITH CO2 INSUFFLATION;  Surgeon: Shady Miguel MD;  Location: [facility] OR     NO HISTORY OF SURGERY         Social History   Substance Use Topics     Smoking status: Never Smoker     Smokeless tobacco: Never Used      Comment: Lives in smoke free household     Alcohol use Yes      Comment: Social drinks occasionally.     Family History   Problem Relation Age of Onset     DIABETES Father      CANCER Father      Hypertension Father      Hypertension Mother      CEREBROVASCULAR DISEASE No family hx of      Thyroid Disease No family hx of      Glaucoma No family hx of      Macular Degeneration No family hx of            Reviewed and updated as needed this visit by clinical staff  Tobacco  Allergies  Meds  Med Hx  Surg Hx  Fam Hx  Soc Hx        Reviewed and updated as needed this visit by Provider            Review of Systems  C: NEGATIVE for fever, chills, change in weight  I: NEGATIVE for worrisome rashes, moles or lesions  E: NEGATIVE for vision changes or irritation  ENT: NEGATIVE for ear, mouth and throat problems  R: NEGATIVE for significant cough or SOB  CV: NEGATIVE for chest pain, palpitations or peripheral edema  GI: NEGATIVE for nausea, abdominal pain, heartburn, or change in bowel habits   male: negative for dysuria, hematuria, decreased urinary stream, erectile dysfunction, urethral discharge  M: POSITIVE for significant arthralgias/arthritis  N: NEGATIVE for weakness, dizziness or  "paresthesias  P: NEGATIVE for changes in mood or affect    OBJECTIVE:   /68  Pulse 64  Temp 96.6  F (35.9  C) (Oral)  Resp 14  Ht 6' 0.24\" (1.835 m)  Wt 196 lb 8 oz (89.1 kg)  SpO2 95%  BMI 26.47 kg/m2    Physical Exam  GENERAL: healthy, alert and no distress  EYES: Eyes grossly normal to inspection, PERRL and conjunctivae and sclerae normal  HENT: ear canals and TM's normal, nose and mouth without ulcers or lesions  NECK: no adenopathy and thyroid normal to palpation  RESP: lungs clear to auscultation - no rales, rhonchi or wheezes  CV: regular rate and rhythm, normal S1 S2, no S3 or S4, no murmur, click or rub  ABDOMEN: soft, nontender, no hepatosplenomegaly, no masses and bowel sounds normal  MS: no gross musculoskeletal defects noted, no edema  SKIN: no suspicious lesions or rashes  NEURO: Normal strength and tone, mentation intact and speech normal  PSYCH: mentation appears normal, affect normal/bright    ASSESSMENT/PLAN:   Ceferino was seen today for physical and health maintenance.    Diagnoses and all orders for this visit:    Routine history and physical examination of adult  -     Basic metabolic panel    Hyperlipidemia LDL goal <100  -     Lipid panel reflex to direct LDL Fasting    Mild persistent asthma without complications     Well controlled. Needs refills. Flovent working well.  -     albuterol (PROAIR HFA/PROVENTIL HFA/VENTOLIN HFA) 108 (90 BASE) MCG/ACT Inhaler; Inhale 2 puffs into the lungs every 6 hours as needed for shortness of breath / dyspnea  -     fluticasone (FLOVENT HFA) 220 MCG/ACT Inhaler; Inhale 2 puffs into the lungs 2 times daily      COUNSELING:   Reviewed preventive health counseling, as reflected in patient instructions       Regular exercise       Healthy diet/nutrition     reports that he has never smoked. He has never used smokeless tobacco.    Estimated body mass index is 26.47 kg/(m^2) as calculated from the following:    Height as of this encounter: 6' 0.24\" " (1.835 m).    Weight as of this encounter: 196 lb 8 oz (89.1 kg).   Weight management plan: Discussed healthy diet and exercise guidelines and patient will follow up in 12 months in clinic to re-evaluate.    Counseling Resources:  ATP IV Guidelines  Pooled Cohorts Equation Calculator  FRAX Risk Assessment  ICSI Preventive Guidelines  Dietary Guidelines for Americans, 2010  USDA's MyPlate  ASA Prophylaxis  Lung CA Screening    Shady Ruggiero MD  [facility] [place]  Answers for HPI/ROS submitted by the patient on 3/27/2018   PHQ-2 Score: 0

## 2019-04-16 NOTE — PATIENT INSTRUCTIONS
Preventive Health Recommendations  Male Ages 50 - 64    Yearly exam:             See your health care provider every year in order to  o   Review health changes.   o   Discuss preventive care.    o   Review your medicines if your doctor has prescribed any.     Have a cholesterol test every 5 years, or more frequently if you are at risk for high cholesterol/heart disease.     Have a diabetes test (fasting glucose) every three years. If you are at risk for diabetes, you should have this test more often.     Have a colonoscopy at age 50, or have a yearly FIT test (stool test). These exams will check for colon cancer.      Talk with your health care provider about whether or not a prostate cancer screening test (PSA) is right for you.    You should be tested each year for STDs (sexually transmitted diseases), if you re at risk.     Shots: Get a flu shot each year. Get a tetanus shot every 10 years.     Nutrition:    Eat at least 5 servings of fruits and vegetables daily.     Eat whole-grain bread, whole-wheat pasta and brown rice instead of white grains and rice.     Get adequate Calcium and Vitamin D.     Lifestyle    Exercise for at least 150 minutes a week (30 minutes a day, 5 days a week). This will help you control your weight and prevent disease.     Limit alcohol to one drink per day.     No smoking.     Wear sunscreen to prevent skin cancer.     See your dentist every six months for an exam and cleaning.     See your eye doctor every 1 to 2 years.    Compression Stockings for Varicose Veins:    VASCULAR SURGERY         Your provider has referred you to: **Vascular  Services [phone] - Varicose Veins & None - Please Order Appropriate Testing   [email or web address]     Please be aware that coverage of these services is subject to the terms and limitations of your health insurance plan.  Call member services at your health plan with any benefit or coverage questions.        Please bring the following with you to your appointment:     (1) Any X-Rays, CTs or MRIs which have been performed.  Contact the facility where they were done to arrange for  prior to your scheduled appointment.    (2) List of current medications   (3) This referral request   (4) Any documents/labs given to you for this referral

## 2019-04-26 ENCOUNTER — OFFICE VISIT (OUTPATIENT)
Dept: FAMILY MEDICINE | Facility: CLINIC | Age: 54
End: 2019-04-26
Payer: COMMERCIAL

## 2019-04-26 VITALS
HEIGHT: 72 IN | OXYGEN SATURATION: 98 % | DIASTOLIC BLOOD PRESSURE: 68 MMHG | TEMPERATURE: 97 F | RESPIRATION RATE: 18 BRPM | BODY MASS INDEX: 26.95 KG/M2 | WEIGHT: 199 LBS | SYSTOLIC BLOOD PRESSURE: 120 MMHG | HEART RATE: 57 BPM

## 2019-04-26 DIAGNOSIS — Z83.3 FAMILY HISTORY OF DIABETES MELLITUS: ICD-10-CM

## 2019-04-26 DIAGNOSIS — Z00.00 ROUTINE HISTORY AND PHYSICAL EXAMINATION OF ADULT: Primary | ICD-10-CM

## 2019-04-26 DIAGNOSIS — J45.30 MILD PERSISTENT ASTHMA WITHOUT COMPLICATION: ICD-10-CM

## 2019-04-26 DIAGNOSIS — Z86.73 HISTORY OF STROKE: ICD-10-CM

## 2019-04-26 LAB
ANION GAP SERPL CALCULATED.3IONS-SCNC: 6 MMOL/L (ref 3–14)
BUN SERPL-MCNC: 24 MG/DL (ref 7–30)
CALCIUM SERPL-MCNC: 9 MG/DL (ref 8.5–10.1)
CHLORIDE SERPL-SCNC: 111 MMOL/L (ref 94–109)
CHOLEST SERPL-MCNC: 147 MG/DL
CO2 SERPL-SCNC: 28 MMOL/L (ref 20–32)
CREAT SERPL-MCNC: 0.86 MG/DL (ref 0.66–1.25)
GFR SERPL CREATININE-BSD FRML MDRD: >90 ML/MIN/{1.73_M2}
GLUCOSE SERPL-MCNC: 89 MG/DL (ref 70–99)
HBA1C MFR BLD: 5.3 % (ref 0–5.6)
HDLC SERPL-MCNC: 54 MG/DL
LDLC SERPL CALC-MCNC: 84 MG/DL
NONHDLC SERPL-MCNC: 93 MG/DL
POTASSIUM SERPL-SCNC: 4.6 MMOL/L (ref 3.4–5.3)
SODIUM SERPL-SCNC: 145 MMOL/L (ref 133–144)
TRIGL SERPL-MCNC: 45 MG/DL

## 2019-04-26 PROCEDURE — 36415 COLL VENOUS BLD VENIPUNCTURE: CPT | Performed by: FAMILY MEDICINE

## 2019-04-26 PROCEDURE — 83036 HEMOGLOBIN GLYCOSYLATED A1C: CPT | Performed by: FAMILY MEDICINE

## 2019-04-26 PROCEDURE — 99396 PREV VISIT EST AGE 40-64: CPT | Performed by: FAMILY MEDICINE

## 2019-04-26 PROCEDURE — 80061 LIPID PANEL: CPT | Performed by: FAMILY MEDICINE

## 2019-04-26 PROCEDURE — 80048 BASIC METABOLIC PNL TOTAL CA: CPT | Performed by: FAMILY MEDICINE

## 2019-04-26 RX ORDER — FLUTICASONE PROPIONATE 220 UG/1
2 AEROSOL, METERED RESPIRATORY (INHALATION) 2 TIMES DAILY
Qty: 3 INHALER | Refills: 1 | Status: SHIPPED | OUTPATIENT
Start: 2019-04-26 | End: 2020-12-28

## 2019-04-26 ASSESSMENT — ENCOUNTER SYMPTOMS
DIZZINESS: 0
ARTHRALGIAS: 1
PALPITATIONS: 0
SORE THROAT: 0
HEMATURIA: 0
MYALGIAS: 0
JOINT SWELLING: 0
FREQUENCY: 0
FEVER: 0
ABDOMINAL PAIN: 0
PARESTHESIAS: 0
DIARRHEA: 0
NAUSEA: 0
SHORTNESS OF BREATH: 0
NERVOUS/ANXIOUS: 0
HEMATOCHEZIA: 0
EYE PAIN: 0
WEAKNESS: 0
COUGH: 0
DYSURIA: 0
HEADACHES: 0
HEARTBURN: 0
CONSTIPATION: 0

## 2019-04-26 ASSESSMENT — MIFFLIN-ST. JEOR: SCORE: 1786.41

## 2019-04-26 NOTE — LETTER
[facility]  [address] [place] [address]    April 29, 2019    Ceferino Crouch  [address] [address]      Dear Ceferino,     Cholesterol levels and test for diabetes (A1c) are normal. Kidney function shows a slightly elevated sodium and chloride; cutting down on salt intake and drinking enough water should help.     Results for orders placed or performed in visit on 04/26/19   Lipid Profile (Chol, Trig, HDL, LDL calc)   Result Value Ref Range    Cholesterol 147 <200 mg/dL    Triglycerides 45 <150 mg/dL    HDL Cholesterol 54 >39 mg/dL    LDL Cholesterol Calculated 84 <100 mg/dL    Non HDL Cholesterol 93 <130 mg/dL   Basic metabolic panel  (Ca, Cl, CO2, Creat, Gluc, K, Na, BUN)   Result Value Ref Range    Sodium 145 (H) 133 - 144 mmol/L    Potassium 4.6 3.4 - 5.3 mmol/L    Chloride 111 (H) 94 - 109 mmol/L    Carbon Dioxide 28 20 - 32 mmol/L    Anion Gap 6 3 - 14 mmol/L    Glucose 89 70 - 99 mg/dL    Urea Nitrogen 24 7 - 30 mg/dL    Creatinine 0.86 0.66 - 1.25 mg/dL    GFR Estimate >90 >60 mL/min/[1.73_m2]    GFR Estimate If Black >90 >60 mL/min/[1.73_m2]    Calcium 9.0 8.5 - 10.1 mg/dL   Hemoglobin A1c   Result Value Ref Range    Hemoglobin A1C 5.3 0 - 5.6 %     If you have any questions or concerns, please call myself or my nurse at [phone].    Sincerely    Shady Ruggiero MD/ Melanie Chaudhary CMA

## 2019-04-26 NOTE — LETTER
My Asthma Action Plan  Name: Ceferino Crouch   YOB: 1965  Date: 4/16/2019   My doctor: Shady Ruggiero MD   My clinic: [facility]        My Control Medicine: Fluticasone propionate (Flovent) -   mcg .  My Rescue Medicine: Albuterol (Proair/Ventolin/Proventil) inhaler .   My Asthma Severity: mild persistent  Avoid your asthma triggers: pollens  upper respiratory infections            GREEN ZONE   Good Control    I feel good    No cough or wheeze    Can work, sleep and play without asthma symptoms       Take your asthma control medicine every day.     1. If exercise triggers your asthma, take your rescue medication    15 minutes before exercise or sports, and    During exercise if you have asthma symptoms  2. Spacer to use with inhaler: If you have a spacer, make sure to use it with your inhaler             YELLOW ZONE Getting Worse  I have ANY of these:    I do not feel good    Cough or wheeze    Chest feels tight    Wake up at night   1. Keep taking your Green Zone medications  2. Start taking your rescue medicine:    every 20 minutes for up to 1 hour. Then every 4 hours for 24-48 hours.  3. If you stay in the Yellow Zone for more than 12-24 hours, contact your doctor.  4. If you do not return to the Green Zone in 12-24 hours or you get worse, start taking your oral steroid medicine if prescribed by your provider.           RED ZONE Medical Alert - Get Help  I have ANY of these:    I feel awful    Medicine is not helping    Breathing getting harder    Trouble walking or talking    Nose opens wide to breathe       1. Take your rescue medicine NOW  2. If your provider has prescribed an oral steroid medicine, start taking it NOW  3. Call your doctor NOW  4. If you are still in the Red Zone after 20 minutes and you have not reached your doctor:    Take your rescue medicine again and    Call 911 or go to the emergency room right away    See your regular doctor within 2 weeks of  an Emergency Room or Urgent Care visit for follow-up treatment.          Annual Reminders:  Meet with Asthma Educator,  Flu Shot in the Fall, consider Pneumonia Vaccination for patients with asthma (aged 19 and older).    Pharmacy:    WRITTEN PRESCRIPTION REQUESTED  [facility]/PHARMACY #[id number] - [place] - [address]                      Asthma Triggers  How To Control Things That Make Your Asthma Worse    Triggers are things that make your asthma worse.  Look at the list below to help you find your triggers and what you can do about them.  You can help prevent asthma flare-ups by staying away from your triggers.      Trigger                                                          What you can do   Cigarette Smoke  Tobacco smoke can make asthma worse. Do not allow smoking in your home, car or around you.  Be sure no one smokes at a child s day care or school.  If you smoke, ask your health care provider for ways to help you quit.  Ask family members to quit too.  Ask your health care provider for a referral to Quit Plan to help you quit smoking, or call 9-240-392-PLAN.     Colds, Flu, Bronchitis  These are common triggers of asthma. Wash your hands often.  Don t touch your eyes, nose or mouth.  Get a flu shot every year.     Dust Mites  These are tiny bugs that live in cloth or carpet. They are too small to see. Wash sheets and blankets in hot water every week.   Encase pillows and mattress in dust mite proof covers.  Avoid having carpet if you can. If you have carpet, vacuum weekly.   Use a dust mask and HEPA vacuum.   Pollen and Outdoor Mold  Some people are allergic to trees, grass, or weed pollen, or molds. Try to keep your windows closed.  Limit time out doors when pollen count is high.   Ask you health care provider about taking medicine during allergy season.     Animal Dander  Some people are allergic to skin flakes, urine or saliva from pets with fur or feathers. Keep  pets with fur or feathers out of your home.    If you can t keep the pet outdoors, then keep the pet out of your bedroom.  Keep the bedroom door closed.  Keep pets off cloth furniture and away from stuffed toys.     Mice, Rats, and Cockroaches  Some people are allergic to the waste from these pests.   Cover food and garbage.  Clean up spills and food crumbs.  Store grease in the refrigerator.   Keep food out of the bedroom.   Indoor Mold  This can be a trigger if your home has high moisture. Fix leaking faucets, pipes, or other sources of water.   Clean moldy surfaces.  Dehumidify basement if it is damp and smelly.   Smoke, Strong Odors, and Sprays  These can reduce air quality. Stay away from strong odors and sprays, such as perfume, powder, hair spray, paints, smoke incense, paint, cleaning products, candles and new carpet.   Exercise or Sports  Some people with asthma have this trigger. Be active!  Ask your doctor about taking medicine before sports or exercise to prevent symptoms.    Warm up for 5-10 minutes before and after sports or exercise.     Other Triggers of Asthma  Cold air:  Cover your nose and mouth with a scarf.  Sometimes laughing or crying can be a trigger.  Some medicines and food can trigger asthma.

## 2019-04-26 NOTE — LETTER
My Asthma Action Plan  Name: Ceferino Crouch   YOB: 1965  Date: 4/26/2019   My doctor: Shady Ruggiero MD   My clinic: [facility]        My Control Medicine: Fluticasone propionate (Flovent) -   mcg .  My Rescue Medicine: Albuterol (Proair/Ventolin/Proventil) inhaler .   My Asthma Severity: mild persistent  Avoid your asthma triggers: pollens and exercise or sports  upper respiratory infections            GREEN ZONE   Good Control    I feel good    No cough or wheeze    Can work, sleep and play without asthma symptoms       Take your asthma control medicine every day.     1. If exercise triggers your asthma, take your rescue medication    15 minutes before exercise or sports, and    During exercise if you have asthma symptoms  2. Spacer to use with inhaler: If you have a spacer, make sure to use it with your inhaler             YELLOW ZONE Getting Worse  I have ANY of these:    I do not feel good    Cough or wheeze    Chest feels tight    Wake up at night   1. Keep taking your Green Zone medications  2. Start taking your rescue medicine:    every 20 minutes for up to 1 hour. Then every 4 hours for 24-48 hours.  3. If you stay in the Yellow Zone for more than 12-24 hours, contact your doctor.  4. If you do not return to the Green Zone in 12-24 hours or you get worse, start taking your oral steroid medicine if prescribed by your provider.           RED ZONE Medical Alert - Get Help  I have ANY of these:    I feel awful    Medicine is not helping    Breathing getting harder    Trouble walking or talking    Nose opens wide to breathe       1. Take your rescue medicine NOW  2. If your provider has prescribed an oral steroid medicine, start taking it NOW  3. Call your doctor NOW  4. If you are still in the Red Zone after 20 minutes and you have not reached your doctor:    Take your rescue medicine again and    Call 911 or go to the emergency room right away    See your regular  doctor within 2 weeks of an Emergency Room or Urgent Care visit for follow-up treatment.          Annual Reminders:  Meet with Asthma Educator,  Flu Shot in the Fall, consider Pneumonia Vaccination for patients with asthma (aged 19 and older).    Pharmacy:    WRITTEN PRESCRIPTION REQUESTED  [facility]/PHARMACY #[id number] - [place] - [address]                      Asthma Triggers  How To Control Things That Make Your Asthma Worse    Triggers are things that make your asthma worse.  Look at the list below to help you find your triggers and what you can do about them.  You can help prevent asthma flare-ups by staying away from your triggers.      Trigger                                                          What you can do   Cigarette Smoke  Tobacco smoke can make asthma worse. Do not allow smoking in your home, car or around you.  Be sure no one smokes at a child s day care or school.  If you smoke, ask your health care provider for ways to help you quit.  Ask family members to quit too.  Ask your health care provider for a referral to Quit Plan to help you quit smoking, or call 5-530-481-PLAN.     Colds, Flu, Bronchitis  These are common triggers of asthma. Wash your hands often.  Don t touch your eyes, nose or mouth.  Get a flu shot every year.     Dust Mites  These are tiny bugs that live in cloth or carpet. They are too small to see. Wash sheets and blankets in hot water every week.   Encase pillows and mattress in dust mite proof covers.  Avoid having carpet if you can. If you have carpet, vacuum weekly.   Use a dust mask and HEPA vacuum.   Pollen and Outdoor Mold  Some people are allergic to trees, grass, or weed pollen, or molds. Try to keep your windows closed.  Limit time out doors when pollen count is high.   Ask you health care provider about taking medicine during allergy season.     Animal Dander  Some people are allergic to skin flakes, urine or saliva from pets  with fur or feathers. Keep pets with fur or feathers out of your home.    If you can t keep the pet outdoors, then keep the pet out of your bedroom.  Keep the bedroom door closed.  Keep pets off cloth furniture and away from stuffed toys.     Mice, Rats, and Cockroaches  Some people are allergic to the waste from these pests.   Cover food and garbage.  Clean up spills and food crumbs.  Store grease in the refrigerator.   Keep food out of the bedroom.   Indoor Mold  This can be a trigger if your home has high moisture. Fix leaking faucets, pipes, or other sources of water.   Clean moldy surfaces.  Dehumidify basement if it is damp and smelly.   Smoke, Strong Odors, and Sprays  These can reduce air quality. Stay away from strong odors and sprays, such as perfume, powder, hair spray, paints, smoke incense, paint, cleaning products, candles and new carpet.   Exercise or Sports  Some people with asthma have this trigger. Be active!  Ask your doctor about taking medicine before sports or exercise to prevent symptoms.    Warm up for 5-10 minutes before and after sports or exercise.     Other Triggers of Asthma  Cold air:  Cover your nose and mouth with a scarf.  Sometimes laughing or crying can be a trigger.  Some medicines and food can trigger asthma.

## 2019-04-26 NOTE — PROGRESS NOTES
SUBJECTIVE:   CC: Ceferino Crouch is an 53 year old male who presents for preventative health visit.     Healthy Habits:     Getting at least 3 servings of Calcium per day:  Yes    Bi-annual eye exam:  Yes    Dental care twice a year:  Yes    Sleep apnea or symptoms of sleep apnea:  None    Diet:  Regular (no restrictions)    Frequency of exercise:  4-5 days/week    Duration of exercise:  15-30 minutes    Taking medications regularly:  Yes    Medication side effects:  None    PHQ-2 Total Score: 0    Additional concerns today:  No      Health screening at work:   A1c was slightly elevated.    Cholesterol also checked at work.     Varicose veins: on compression.    Asthma: Doing good need refill of Flovent             ACT Total Scores 3/10/2016 3/21/2017 2/8/2018   ACT TOTAL SCORE - - -   ASTHMA ER VISITS - - -   ASTHMA HOSPITALIZATIONS - - -   ACT TOTAL SCORE (Goal Greater than or Equal to 20) 16 11 20   In the past 12 months, how many times did you visit the emergency room for your asthma without being admitted to the hospital? 0 0 0   In the past 12 months, how many times were you hospitalized overnight because of your asthma? 0 0 0     Colonoscopy: 5/2016; a repeat colonoscopy in approximately 5 years    Stroke: Had mini stroke 2.5 yrs ago>                LDL check; if above 70 do statin.  The ASCVD Risk score (Peter JOHN Jr., et al., 2013) failed to calculate for the following reasons:    The patient has a prior MI or stroke diagnosis    Today's PHQ-2 Score:   PHQ-2 ( 1999 Pfizer) 4/26/2019   Q1: Little interest or pleasure in doing things 0   Q2: Feeling down, depressed or hopeless 0   PHQ-2 Score 0   Q1: Little interest or pleasure in doing things Not at all   Q2: Feeling down, depressed or hopeless Not at all   PHQ-2 Score 0       Abuse: Current or Past(Physical, Sexual or Emotional)- No  Do you feel safe in your environment? Yes    Social History     Tobacco Use     Smoking status: Never Smoker     Smokeless  tobacco: Never Used     Tobacco comment: Lives in smoke free household   Substance Use Topics     Alcohol use: Yes     Comment: Social drinks occasionally.     Alcohol Use 4/26/2019   Prescreen: >3 drinks/day or >7 drinks/week? No   Prescreen: >3 drinks/day or >7 drinks/week? -     Last PSA:   PSA   Date Value Ref Range Status   03/21/2017 0.95 0 - 4 ug/L Final     Comment:     Assay Method:  Chemiluminescence using Siemens Vista analyzer     Reviewed orders with patient. 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     DJD (degenerative joint disease)     Mini stroke (H)     Varicose veins of leg with pain, right     Past Surgical History:   Procedure Laterality Date     COLONOSCOPY N/A 5/12/2016    Procedure: COLONOSCOPY;  Surgeon: Shady Miguel MD;  Location: [facility] OR     COLONOSCOPY WITH CO2 INSUFFLATION N/A 5/12/2016    Procedure: COLONOSCOPY WITH CO2 INSUFFLATION;  Surgeon: Shady Miguel MD;  Location: [facility] OR     NO HISTORY OF SURGERY         Social History     Tobacco Use     Smoking status: Never Smoker     Smokeless tobacco: Never Used     Tobacco comment: Lives in smoke free household   Substance Use Topics     Alcohol use: Yes     Comment: Social drinks occasionally.     Family History   Problem Relation Age of Onset     Diabetes Father      Cancer Father      Hypertension Father      Hypertension Mother      Cerebrovascular Disease No family hx of      Thyroid Disease No family hx of      Glaucoma No family hx of      Macular Degeneration No family hx of            Reviewed and updated as needed this visit by Provider    Review of Systems   Constitutional: Negative for fever.   HENT: Negative for congestion, ear pain, hearing loss and sore throat.    Eyes: Negative for pain and visual disturbance.  "  Respiratory: Negative for cough and shortness of breath.    Cardiovascular: Positive for peripheral edema. Negative for chest pain and palpitations.   Gastrointestinal: Negative for abdominal pain, constipation, diarrhea, heartburn, hematochezia and nausea.   Genitourinary: Negative for discharge, dysuria, frequency, genital sores, hematuria, impotence and urgency.   Musculoskeletal: Positive for arthralgias. Negative for joint swelling and myalgias.   Skin: Negative for rash.   Neurological: Negative for dizziness, weakness, headaches and paresthesias.   Psychiatric/Behavioral: Negative for mood changes. The patient is not nervous/anxious.      OBJECTIVE:   /68   Pulse 57   Temp 97  F (36.1  C) (Oral)   Resp 18   Ht 1.83 m (6' 0.05\")   Wt 90.3 kg (199 lb)   SpO2 98%   BMI 26.95 kg/m      Physical Exam  GENERAL: healthy, alert and no distress  EYES: Eyes grossly normal to inspection, PERRL and conjunctivae and sclerae normal  HENT: ear canals and TM's normal, nose and mouth without ulcers or lesions  NECK: no adenopathy and thyroid normal to palpation  RESP: lungs clear to auscultation - no rales, rhonchi or wheezes  CV: regular rate and rhythm, no murmur, click or rub, no peripheral edema   ABDOMEN: soft, nontender, no masses and bowel sounds normal  MS: no gross musculoskeletal defects noted, no edema  SKIN: no suspicious lesions or rashes  NEURO: Normal strength and tone, mentation intact and speech normal  PSYCH: mentation appears normal, affect normal/bright    Diagnostic Test Results:  none     ASSESSMENT/PLAN:   Ceferino was seen today for physical and health maintenance.    Diagnoses and all orders for this visit:    Routine history and physical examination of adult  -     Lipid Profile (Chol, Trig, HDL, LDL calc)  -     Basic metabolic panel  (Ca, Cl, CO2, Creat, Gluc, K, Na, BUN)    Mild persistent asthma without complication        ACT score . Reviewed Asthma Action Plan.  -     fluticasone " "(FLOVENT HFA) 220 MCG/ACT inhaler; Inhale 2 puffs into the lungs 2 times daily    History of stroke    Family history of diabetes mellitus  -     Hemoglobin A1c      COUNSELING:   Reviewed preventive health counseling, as reflected in patient instructions       Regular exercise       Healthy diet/nutrition    BP Readings from Last 1 Encounters:   04/26/19 120/68     Estimated body mass index is 26.95 kg/m  as calculated from the following:    Height as of this encounter: 1.83 m (6' 0.05\").    Weight as of this encounter: 90.3 kg (199 lb).    BP Screening:   Last 3 BP Readings:    BP Readings from Last 3 Encounters:   04/26/19 120/68   03/27/18 104/68   03/21/17 110/70     The following was recommended to the patient:  Re-screen BP within a year and recommended lifestyle modifications  Weight management plan: Discussed healthy diet and exercise guidelines     reports that he has never smoked. He has never used smokeless tobacco.    Counseling Resources:  ATP IV Guidelines  Pooled Cohorts Equation Calculator  FRAX Risk Assessment  ICSI Preventive Guidelines  Dietary Guidelines for Americans, 2010  USDA's MyPlate  ASA Prophylaxis  Lung CA Screening    Shady Ruggiero MD  [facility]  "

## 2019-11-14 NOTE — PROGRESS NOTES
Subjective     Ceferino Crouch is a 54 year old male who presents to clinic today for the following health issues:    HPI   Chief Complaint   Patient presents with     UTI     Erectile Dysfunction     Would like to discuss further and possibly what is going on; has been going on for 8 years ; Sometimes unable to get aroused and other times unable to stay aroused     Health Maintenance     ACT     Genitourinary symptoms      Duration: 6 months to 1 year    Description:  frequency, nocturia x 3 times per night and hesitancy    Intensity:  moderate    Accompanying signs and symptoms (fever/discharge/nausea/vomiting/back or abdominal pain):  None    History (frequent UTI's/kidney stones/prostate problems): None  Sexually active: YES    Precipitating or alleviating factors: None    Therapies tried and outcome: increased water intake   Outcome: no relief      Patient presents for frequent urination 6 months - 1 year  Gets up 3x per night to urinate  No blood or mucous in urine  Sometimes has a weak flow and hesitancy with dribbling      Erectile issues - x 8 years, still has morning erections  50% of the time able to start an erection  Loses erection most of the time    Patient has a history of TIA, symptoms have resolved, no issues since his episode, wondering if there is additional testing that needs to be done regarding this issue.      BP Readings from Last 3 Encounters:   11/15/19 108/72   04/26/19 120/68   03/27/18 104/68    Wt Readings from Last 3 Encounters:   11/15/19 92.5 kg (204 lb)   04/26/19 90.3 kg (199 lb)   03/27/18 89.1 kg (196 lb 8 oz)                      Reviewed and updated as needed this visit by Provider  Tobacco  Allergies  Meds  Problems  Med Hx  Surg Hx  Fam Hx         Review of Systems   ROS COMP: Constitutional, HEENT, cardiovascular, pulmonary, gi and gu systems are negative, except as otherwise noted.      Objective    /72   Pulse 58   Temp 96.7  F (35.9  C) (Oral)   Resp 18   " Ht 1.83 m (6' 0.05\")   Wt 92.5 kg (204 lb)   SpO2 98%   BMI 27.63 kg/m    Body mass index is 27.63 kg/m .  Physical Exam   GENERAL: healthy, alert and no distress  EYES: Eyes grossly normal to inspection, PERRL and conjunctivae and sclerae normal  HENT: ear canals and TM's normal, nose and mouth without ulcers or lesions  NECK: no adenopathy, no asymmetry, masses, or scars and thyroid normal to palpation  RESP: lungs clear to auscultation - no rales, rhonchi or wheezes  CV: regular rate and rhythm, normal S1 S2, no S3 or S4, no murmur, click or rub, no peripheral edema and peripheral pulses strong  ABDOMEN: soft, nontender, no hepatosplenomegaly, no masses and bowel sounds normal  MS: no gross musculoskeletal defects noted, no edema  SKIN: no suspicious lesions or rashes  NEURO: Normal strength and tone, mentation intact and speech normal  PSYCH: mentation appears normal, affect normal/bright            Assessment & Plan       ICD-10-CM    1. Dysuria R30.0 UA reflex to Microscopic and Culture     PSA, screen   2. Urinary hesitancy R39.11 tamsulosin (FLOMAX) 0.4 MG capsule     PSA, screen   3. Erectile dysfunction, unspecified erectile dysfunction type N52.9 sildenafil (VIAGRA) 50 MG tablet     **Testosterone Free and Total FUTURE anytime   4. History of TIA (transient ischemic attack) and stroke Z86.73 US Carotid Bilateral        Will check UA/PSA, symptoms are most likely due to BPH, will give trial of flomax to help with this  ED - discussed flomax side effects, will give low dose viagra, discussed potential for low blood pressure when taken with flomax.  Will check testosterone in the future  TIA history - recommend patient has carotid ultrasound            Return in about 3 months (around 2/15/2020) for For Re-Assessment.    Arturo Cagle MD  [facility]      "

## 2019-11-15 ENCOUNTER — OFFICE VISIT (OUTPATIENT)
Dept: FAMILY MEDICINE | Facility: CLINIC | Age: 54
End: 2019-11-15
Payer: COMMERCIAL

## 2019-11-15 VITALS
HEART RATE: 58 BPM | BODY MASS INDEX: 27.63 KG/M2 | OXYGEN SATURATION: 98 % | WEIGHT: 204 LBS | HEIGHT: 72 IN | DIASTOLIC BLOOD PRESSURE: 72 MMHG | TEMPERATURE: 96.7 F | SYSTOLIC BLOOD PRESSURE: 108 MMHG | RESPIRATION RATE: 18 BRPM

## 2019-11-15 DIAGNOSIS — R39.11 URINARY HESITANCY: ICD-10-CM

## 2019-11-15 DIAGNOSIS — R30.0 DYSURIA: Primary | ICD-10-CM

## 2019-11-15 DIAGNOSIS — N52.9 ERECTILE DYSFUNCTION, UNSPECIFIED ERECTILE DYSFUNCTION TYPE: ICD-10-CM

## 2019-11-15 DIAGNOSIS — Z86.73 HISTORY OF TIA (TRANSIENT ISCHEMIC ATTACK) AND STROKE: ICD-10-CM

## 2019-11-15 LAB
ALBUMIN UR-MCNC: NEGATIVE MG/DL
APPEARANCE UR: CLEAR
BILIRUB UR QL STRIP: NEGATIVE
COLOR UR AUTO: YELLOW
GLUCOSE UR STRIP-MCNC: NEGATIVE MG/DL
HGB UR QL STRIP: NEGATIVE
KETONES UR STRIP-MCNC: NEGATIVE MG/DL
LEUKOCYTE ESTERASE UR QL STRIP: NEGATIVE
NITRATE UR QL: NEGATIVE
PH UR STRIP: 7 PH (ref 5–7)
PSA SERPL-ACNC: 0.96 UG/L (ref 0–4)
SOURCE: NORMAL
SP GR UR STRIP: 1.02 (ref 1–1.03)
UROBILINOGEN UR STRIP-ACNC: 0.2 EU/DL (ref 0.2–1)

## 2019-11-15 PROCEDURE — 36415 COLL VENOUS BLD VENIPUNCTURE: CPT | Performed by: FAMILY MEDICINE

## 2019-11-15 PROCEDURE — 81003 URINALYSIS AUTO W/O SCOPE: CPT | Performed by: FAMILY MEDICINE

## 2019-11-15 PROCEDURE — G0103 PSA SCREENING: HCPCS | Performed by: FAMILY MEDICINE

## 2019-11-15 PROCEDURE — 99214 OFFICE O/P EST MOD 30 MIN: CPT | Performed by: FAMILY MEDICINE

## 2019-11-15 RX ORDER — TAMSULOSIN HYDROCHLORIDE 0.4 MG/1
0.4 CAPSULE ORAL DAILY
Qty: 30 CAPSULE | Refills: 2 | Status: SHIPPED | OUTPATIENT
Start: 2019-11-15 | End: 2020-05-12

## 2019-11-15 RX ORDER — SILDENAFIL 50 MG/1
25 TABLET, FILM COATED ORAL DAILY PRN
Qty: 30 TABLET | Refills: 2 | Status: SHIPPED | OUTPATIENT
Start: 2019-11-15 | End: 2022-12-23

## 2019-11-15 ASSESSMENT — ASTHMA QUESTIONNAIRES
ACUTE_EXACERBATION_TODAY: NO
QUESTION_1 LAST FOUR WEEKS HOW MUCH OF THE TIME DID YOUR ASTHMA KEEP YOU FROM GETTING AS MUCH DONE AT WORK, SCHOOL OR AT HOME: NONE OF THE TIME
QUESTION_5 LAST FOUR WEEKS HOW WOULD YOU RATE YOUR ASTHMA CONTROL: COMPLETELY CONTROLLED
QUESTION_3 LAST FOUR WEEKS HOW OFTEN DID YOUR ASTHMA SYMPTOMS (WHEEZING, COUGHING, SHORTNESS OF BREATH, CHEST TIGHTNESS OR PAIN) WAKE YOU UP AT NIGHT OR EARLIER THAN USUAL IN THE MORNING: NOT AT ALL
QUESTION_2 LAST FOUR WEEKS HOW OFTEN HAVE YOU HAD SHORTNESS OF BREATH: NOT AT ALL
ACT_TOTALSCORE: 25
QUESTION_4 LAST FOUR WEEKS HOW OFTEN HAVE YOU USED YOUR RESCUE INHALER OR NEBULIZER MEDICATION (SUCH AS ALBUTEROL): NOT AT ALL

## 2019-11-15 ASSESSMENT — MIFFLIN-ST. JEOR: SCORE: 1804.09

## 2019-11-15 NOTE — PATIENT INSTRUCTIONS
Patient Education     BPH (Enlarged Prostate)  The prostate is a gland at the base of the bladder. As some men get older, the prostate may get bigger in size. This problem is called benign prostatic hyperplasia (BPH). BPH puts pressure on the urethra. This is the tube that carries urine from the bladder to the penis. It may interfere with the flow of urine. It may also keep the bladder from emptying fully.    Symptoms of BPH include trouble starting urination and feeling as though the bladder isn t emptying all the way. It also includes a weak urine stream, dribbling and leaking of urine, and frequent and urgent urination (especially at night). BPH can increase the risk of urinary infections. It can also block off urine flow completely. If this occurs, a thin tube (catheter) may be passed into the bladder to help drain urine.  If symptoms are mild, no treatment may be needed right now. If symptoms are more severe, treatment is likely needed. The goal of treatment is to improve urine flow and reduce symptoms. Treatments can include medicine and procedures. Your healthcare provider will discuss treatment options with you as needed.  Home care  The following guidelines will help you care for yourself at home:    Urinate as soon as you feel the urge. Don't try to hold your urine.    Don't limit your fluid intake during the day. Drink 6 to 8 glasses of water or liquids a day. This prevents bacteria from building up in the bladder.    Avoid drinking fluids after dinner to help reduce urination during the night.    Avoid medicines that can worsen your symptoms. These include certain cold and allergy medicines and antidepressants. Diuretics used for high blood pressure can also worsen symptoms. Talk to your doctor about the medicines you take. Other choices may work better for you.  Prostate cancer screening  BPH does not increase the risk of prostate cancer. But because prostate cancer is a common cancer in men,  screening is sometimes recommended. This may help detect the cancer in its early stages when treatment is most effective. Factors that can increase the risk of prostate cancer include being -American or having a father or brother who had prostate cancer. A high-fat diet may also increase the risk of prostate cancer. Talk to your healthcare provider to see whether you should be screened for prostate cancer.  Follow-up care  Follow up with your healthcare provider, or as advised  To learn more, go to:    National Kidney & Urologic Diseases Information Clearinghouse  kidney.niddk.nih.gov, 718.629.7075  When to seek medical advice  Call your healthcare provider right away if any of these occur:    Fever of 100.4 F (38.0 C) or higher, or as advised    Unable to pass urine for 8 hours    Increasing pressure or pain in your bladder (lower abdomen)    Blood in the urine    Increasing low back pain, not related to injury    Symptoms of urinary infection (increased urge to urinate, burning when passing urine, foul-smelling urine)  Date Last Reviewed: 7/1/2016 2000-2018 [employer]. [address] [place] [address], [place] [address]. All rights reserved. This information is not intended as a substitute for professional medical care. Always follow your healthcare professional's instructions.

## 2019-11-15 NOTE — LETTER
[address] [place] [address]    November 18, 2019    Ceferino Crouch  [address] [place] [address]        Dear Ceferino,    Your PSA was normal.     Enclosed is a copy of your results.     Results for orders placed or performed in visit on 11/15/19   UA reflex to Microscopic and Culture     Status: None   Result Value Ref Range    Color Urine Yellow     Appearance Urine Clear     Glucose Urine Negative NEG^Negative mg/dL    Bilirubin Urine Negative NEG^Negative    Ketones Urine Negative NEG^Negative mg/dL    Specific Gravity Urine 1.020 1.003 - 1.035    Blood Urine Negative NEG^Negative    pH Urine 7.0 5.0 - 7.0 pH    Protein Albumin Urine Negative NEG^Negative mg/dL    Urobilinogen Urine 0.2 0.2 - 1.0 EU/dL    Nitrite Urine Negative NEG^Negative    Leukocyte Esterase Urine Negative NEG^Negative    Source Midstream Urine    PSA, screen     Status: None   Result Value Ref Range    PSA 0.96 0 - 4 ug/L       If you have any questions or concerns, please call myself or my nurse at [phone].      Sincerely,        Arturo Coffey MD/reed

## 2019-11-16 ASSESSMENT — ASTHMA QUESTIONNAIRES: ACT_TOTALSCORE: 25

## 2019-12-12 ENCOUNTER — TRANSFERRED RECORDS (OUTPATIENT)
Dept: HEALTH INFORMATION MANAGEMENT | Facility: CLINIC | Age: 54
End: 2019-12-12

## 2020-02-18 ENCOUNTER — TRANSFERRED RECORDS (OUTPATIENT)
Dept: HEALTH INFORMATION MANAGEMENT | Facility: CLINIC | Age: 55
End: 2020-02-18

## 2020-05-11 DIAGNOSIS — R39.11 URINARY HESITANCY: ICD-10-CM

## 2020-05-11 NOTE — LETTER
[address] [facility] [address]          Dear Ceferino,       Your provider has sent a 30 day anderson refill of tamsulosin (FLOMAX) 0.4 MG capsule. You are due for an appointment for further refills. Appointment options could include: an in person office visit, telephone visit or Evisit through [employer]. Please contact the clinic to schedule an appointment for further refills.     Sincerely,     [facility]

## 2020-05-12 RX ORDER — TAMSULOSIN HYDROCHLORIDE 0.4 MG/1
0.4 CAPSULE ORAL DAILY
Qty: 30 CAPSULE | Refills: 0 | Status: SHIPPED | OUTPATIENT
Start: 2020-05-12 | End: 2020-12-28

## 2020-05-12 NOTE — TELEPHONE ENCOUNTER
Medication is being filled for 1 time refill only due to:  Patient needs to be seen because overdue for recheck.   Please schedule-  Return in about 3 months (around 2/15/2020) for For Re-Assessment.     Arturo Cagle MD  [facility]

## 2020-06-06 DIAGNOSIS — R39.11 URINARY HESITANCY: ICD-10-CM

## 2020-06-06 NOTE — LETTER
June 9, 2020          Ceferino Crouch,  [address] [facility] [place] [address]            Dear Ceferino Crouch      Your provider has not sent you a refill for TAMSULOSIN HCL 0.4 MG CAPSULE because you are due for an appointment for further refills. We are currently only able to see select in person visits. We ask that you schedule a telephone visit, video visit or evisit (through [employer]) with your provider.  Please contact the clinic to schedule an appointment for further refills.     Sincerely,         Your [place] [facility] Team/

## 2020-06-07 NOTE — TELEPHONE ENCOUNTER
Routing refill request to provider for review/approval because:  Ness given x1 and patient did not follow up, please advise    Return in about 3 months (around 2/15/2020) for For Re-Assessment.     Arturo Cagle MD  [facility]

## 2020-06-09 RX ORDER — TAMSULOSIN HYDROCHLORIDE 0.4 MG/1
CAPSULE ORAL
Qty: 30 CAPSULE | Refills: 0 | OUTPATIENT
Start: 2020-06-09

## 2020-12-24 NOTE — PROGRESS NOTES
SUBJECTIVE:   CC: Ceferino Crouch is an 55 year old male who presents for preventative health visit.     Patient has been advised of split billing requirements and indicates understanding: Yes  Healthy Habits:     Getting at least 3 servings of Calcium per day:  Yes    Bi-annual eye exam:  Yes    Dental care twice a year:  Yes    Sleep apnea or symptoms of sleep apnea:  Sleep apnea    Diet:  Regular (no restrictions)    Frequency of exercise:  1 day/week    Duration of exercise:  Less than 15 minutes    Taking medications regularly:  Yes    Medication side effects:  None    PHQ-2 Total Score: 0    Additional concerns today:  Yes (Right leg varicose veins ; was seen for it 4-5 years ago and was given compression stockings for discomfort)    Urinary hesitancy and frequency 2/2 BPH, nocturia, incomplete emptying  flomax was effective, has run out of the medication  Varicose veins right leg, would like to touch base with vascular specialist again, has intermittent pain      Today's PHQ-2 Score:   PHQ-2 ( 1999 Pfizer) 12/28/2020   Q1: Little interest or pleasure in doing things 0   Q2: Feeling down, depressed or hopeless 0   PHQ-2 Score 0   Q1: Little interest or pleasure in doing things Not at all   Q2: Feeling down, depressed or hopeless Not at all   PHQ-2 Score 0     Wt Readings from Last 4 Encounters:   12/28/20 88.5 kg (195 lb)   11/15/19 92.5 kg (204 lb)   04/26/19 90.3 kg (199 lb)   03/27/18 89.1 kg (196 lb 8 oz)       Abuse: Current or Past(Physical, Sexual or Emotional)- No  Do you feel safe in your environment? Yes    Have you ever done Advance Care Planning? (For example, a Health Directive, POLST, or a discussion with a medical provider or your loved ones about your wishes): No, advance care planning information given to patient to review.  Patient declined advance care planning discussion at this time.    Social History     Tobacco Use     Smoking status: Never Smoker     Smokeless tobacco: Never Used      "Tobacco comment: Lives in smoke free household   Substance Use Topics     Alcohol use: Yes     Comment: Social drinks occasionally.     If you drink alcohol do you typically have >3 drinks per day or >7 drinks per week? No    Alcohol Use 12/28/2020   Prescreen: >3 drinks/day or >7 drinks/week? No   Prescreen: >3 drinks/day or >7 drinks/week? -   No flowsheet data found.    Last PSA:   PSA   Date Value Ref Range Status   11/15/2019 0.96 0 - 4 ug/L Final     Comment:     Assay Method:  Chemiluminescence using Siemens Vista analyzer       Reviewed orders with patient. Reviewed health maintenance and updated orders accordingly - Yes  BP Readings from Last 3 Encounters:   12/28/20 114/76   11/15/19 108/72   04/26/19 120/68    Wt Readings from Last 3 Encounters:   12/28/20 88.5 kg (195 lb)   11/15/19 92.5 kg (204 lb)   04/26/19 90.3 kg (199 lb)                    Reviewed and updated as needed this visit by clinical staff  Tobacco  Allergies  Meds  Problems  Med Hx  Surg Hx  Fam Hx          Reviewed and updated as needed this visit by Provider  Tobacco  Allergies  Meds  Problems  Med Hx  Surg Hx  Fam Hx             Review of Systems   Constitutional: Negative for chills.   HENT: Negative for congestion.    Respiratory: Negative for cough.    Cardiovascular: Negative for chest pain.   Gastrointestinal: Negative for abdominal pain, constipation, diarrhea and hematochezia.   Genitourinary: Negative for hematuria.         OBJECTIVE:   /76   Pulse 75   Temp 97.9  F (36.6  C) (Oral)   Resp 18   Ht 1.839 m (6' 0.4\")   Wt 88.5 kg (195 lb)   SpO2 96%   BMI 26.15 kg/m      Physical Exam  GENERAL: healthy, alert and no distress  EYES: Eyes grossly normal to inspection, PERRL and conjunctivae and sclerae normal  HENT: ear canals and TM's normal, nose and mouth without ulcers or lesions  NECK: no adenopathy, no asymmetry, masses, or scars and thyroid normal to palpation  RESP: lungs clear to auscultation - " "no rales, rhonchi or wheezes  CV: regular rate and rhythm, normal S1 S2, no S3 or S4, no murmur, click or rub, no peripheral edema and peripheral pulses strong  ABDOMEN: soft, nontender, no hepatosplenomegaly, no masses and bowel sounds normal  MS: no gross musculoskeletal defects noted, no edema  SKIN: no suspicious lesions or rashes  NEURO: Normal strength and tone, mentation intact and speech normal  PSYCH: mentation appears normal, affect normal/bright        ASSESSMENT/PLAN:       ICD-10-CM    1. Routine general medical examination at a health care facility  Z00.00 Hemoglobin A1c     Comprehensive metabolic panel   2. Screening for hyperlipidemia  Z13.220 Lipid panel reflex to direct LDL Fasting   3. Mild persistent asthma without complication  J45.30 fluticasone (FLOVENT HFA) 220 MCG/ACT inhaler     albuterol (PROAIR HFA/PROVENTIL HFA/VENTOLIN HFA) 108 (90 Base) MCG/ACT inhaler   4. Urinary hesitancy  R39.11 tamsulosin (FLOMAX) 0.4 MG capsule   5. Screening for prostate cancer  Z12.5 Prostate spec antigen screen   6. BMI 26.0-26.9,adult  Z68.26 Hemoglobin A1c     Comprehensive metabolic panel   7. Varicose veins of both lower extremities with pain  I83.813 VASCULAR SURGERY REFERRAL   referral placed to [facility]  Medications refilled    Patient has been advised of split billing requirements and indicates understanding: Yes  COUNSELING:   Reviewed preventive health counseling, as reflected in patient instructions       Regular exercise       Healthy diet/nutrition       Colon cancer screening       Prostate cancer screening    Estimated body mass index is 26.15 kg/m  as calculated from the following:    Height as of this encounter: 1.839 m (6' 0.4\").    Weight as of this encounter: 88.5 kg (195 lb).         He reports that he has never smoked. He has never used smokeless tobacco.      Counseling Resources:  ATP IV Guidelines  Pooled Cohorts Equation Calculator  FRAX Risk Assessment  ICSI Preventive " Guidelines  Dietary Guidelines for Americans, 2010  USDA's MyPlate  ASA Prophylaxis  Lung CA Screening    Arturo Cagle MD  [facility]

## 2020-12-24 NOTE — PATIENT INSTRUCTIONS
Preventive Health Recommendations  Male Ages 50 - 64    Yearly exam:             See your health care provider every year in order to  o   Review health changes.   o   Discuss preventive care.    o   Review your medicines if your doctor has prescribed any.     Have a cholesterol test every 5 years, or more frequently if you are at risk for high cholesterol/heart disease.     Have a diabetes test (fasting glucose) every three years. If you are at risk for diabetes, you should have this test more often.     Have a colonoscopy at age 50, or have a yearly FIT test (stool test). These exams will check for colon cancer.      Talk with your health care provider about whether or not a prostate cancer screening test (PSA) is right for you.    You should be tested each year for STDs (sexually transmitted diseases), if you re at risk.     Shots: Get a flu shot each year. Get a tetanus shot every 10 years.     Nutrition:    Eat at least 5 servings of fruits and vegetables daily.     Eat whole-grain bread, whole-wheat pasta and brown rice instead of white grains and rice.     Get adequate Calcium and Vitamin D.     Lifestyle    Exercise for at least 150 minutes a week (30 minutes a day, 5 days a week). This will help you control your weight and prevent disease.     Limit alcohol to one drink per day.     No smoking.     Wear sunscreen to prevent skin cancer.     See your dentist every six months for an exam and cleaning.     See your eye doctor every 1 to 2 years.    Healthy Lifestyle   Nutrition and healthy eating: The Mediterranean Diet  Ready to switch to a more heart-healthy diet? Here's how to get started with the Mediterranean diet.  By [facility] Staff   If you're looking for a heart-healthy eating plan, the Mediterranean diet might be right for you.  The Mediterranean diet blends the basics of healthy eating with the traditional flavors and cooking methods of the [place].  Interest in the Mediterranean diet  began in the 1960s with the observation that coronary heart disease caused fewer deaths in Mediterranean countries, such as [place] and [place], than in the [place] and northern [place]. Subsequent studies found that the Mediterranean diet is associated with reduced risk factors for cardiovascular disease.  The Mediterranean diet is one of the healthy eating plans recommended by the Dietary Guidelines for Americans to promote health and prevent chronic disease.  It is also recognized by the World Health Organization as a healthy and sustainable dietary pattern and as an intangible cultural asset by the United National Educational, Scientific and Cultural Organization.  The Mediterranean diet is a way of eating based on the traditional cuisine of countries bordering the [place] Sea. While there is no single definition of the Mediterranean diet, it is typically high in vegetables, fruits, whole grains, beans, nut and seeds, and olive oil.  The main components of Mediterranean diet include:    Daily consumption of vegetables, fruits, whole grains and healthy fats     Weekly intake of fish, poultry, beans and eggs     Moderate portions of dairy products     Limited intake of red meat  Other important elements of the Mediterranean diet are sharing meals with family and friends, enjoying a glass of red wine and being physically active.  The foundation of the Mediterranean diet is vegetables, fruits, herbs, nuts, beans and whole grains. Meals are built around these plant-based foods. Moderate amounts of dairy, poultry and eggs are also central to the Mediterranean Diet, as is seafood. In contrast, red meat is eaten only occasionally.  Healthy fats are a mainstay of the Mediterranean diet. They're eaten instead of less healthy fats, such as saturated and trans fats, which contribute to heart disease.  Olive oil is the primary source of added fat in the Mediterranean diet. Olive oil provides monounsaturated fat, which has  "been found to lower total cholesterol and low-density lipoprotein (LDL or \"bad\") cholesterol levels. Nuts and seeds also contain monounsaturated fat.  Fish are also important in the Mediterranean diet. Fatty fish -- such as mackerel, herring, sardines, albacore tuna, salmon and lake trout -- are rich in omega-3 fatty acids, a type of polyunsaturated fat that may reduce inflammation in the body. Omega-3 fatty acids also help decrease triglycerides, reduce blood clotting, and decrease the risk of stroke and heart failure.  The Mediterranean diet typically allows red wine in moderation. Although alcohol has been associated with a reduced risk of heart disease in some studies, it's by no means risk free. The Dietary Guidelines for Americans caution against beginning to drink or drinking more often on the basis of potential health benefits.  Interested in trying the Mediterranean diet? These tips will help you get started:    Eat more fruits and vegetables. Aim for 7 to 10 servings a day of fruit and vegetables.     Opt for whole grains. Switch to whole-grain bread, cereal and pasta. [place] with other whole grains, such as bulgur and farro.     Use healthy fats. Try olive oil as a replacement for butter when cooking. Instead of putting butter or margarine on bread, try dipping it in flavored olive oil.     Eat more seafood. Eat fish twice a week. Fresh or water-packed tuna, salmon, trout, mackerel and herring are healthy choices. Grilled fish tastes good and requires little cleanup. Avoid deep-fried fish.     Reduce red meat. Substitute fish, poultry or beans for meat. If you eat meat, make sure it's lean and keep portions small.     Enjoy some dairy. Eat low-fat Greek or plain yogurt and small amounts of a variety of cheeses.     Spice it up. Herbs and spices boost flavor and lessen the need for salt.  The Mediterranean diet is a delicious and healthy way to eat. Many people who switch to this style of eating say " they'll never eat any other way.

## 2020-12-28 ENCOUNTER — OFFICE VISIT (OUTPATIENT)
Dept: FAMILY MEDICINE | Facility: CLINIC | Age: 55
End: 2020-12-28
Payer: COMMERCIAL

## 2020-12-28 VITALS
OXYGEN SATURATION: 96 % | BODY MASS INDEX: 26.41 KG/M2 | HEIGHT: 72 IN | TEMPERATURE: 97.9 F | RESPIRATION RATE: 18 BRPM | SYSTOLIC BLOOD PRESSURE: 114 MMHG | DIASTOLIC BLOOD PRESSURE: 76 MMHG | WEIGHT: 195 LBS | HEART RATE: 75 BPM

## 2020-12-28 DIAGNOSIS — Z12.5 SCREENING FOR PROSTATE CANCER: ICD-10-CM

## 2020-12-28 DIAGNOSIS — J45.30 MILD PERSISTENT ASTHMA WITHOUT COMPLICATION: ICD-10-CM

## 2020-12-28 DIAGNOSIS — Z00.00 ROUTINE GENERAL MEDICAL EXAMINATION AT A HEALTH CARE FACILITY: Primary | ICD-10-CM

## 2020-12-28 DIAGNOSIS — Z13.220 SCREENING FOR HYPERLIPIDEMIA: ICD-10-CM

## 2020-12-28 DIAGNOSIS — R39.11 URINARY HESITANCY: ICD-10-CM

## 2020-12-28 DIAGNOSIS — I83.813 VARICOSE VEINS OF BOTH LOWER EXTREMITIES WITH PAIN: ICD-10-CM

## 2020-12-28 LAB
ALBUMIN SERPL-MCNC: 3.9 G/DL (ref 3.4–5)
ALP SERPL-CCNC: 54 U/L (ref 40–150)
ALT SERPL W P-5'-P-CCNC: 27 U/L (ref 0–70)
ANION GAP SERPL CALCULATED.3IONS-SCNC: 4 MMOL/L (ref 3–14)
AST SERPL W P-5'-P-CCNC: 17 U/L (ref 0–45)
BILIRUB SERPL-MCNC: 0.8 MG/DL (ref 0.2–1.3)
BUN SERPL-MCNC: 14 MG/DL (ref 7–30)
CALCIUM SERPL-MCNC: 9 MG/DL (ref 8.5–10.1)
CHLORIDE SERPL-SCNC: 108 MMOL/L (ref 94–109)
CHOLEST SERPL-MCNC: 177 MG/DL
CO2 SERPL-SCNC: 29 MMOL/L (ref 20–32)
CREAT SERPL-MCNC: 0.96 MG/DL (ref 0.66–1.25)
GFR SERPL CREATININE-BSD FRML MDRD: 89 ML/MIN/{1.73_M2}
GLUCOSE SERPL-MCNC: 87 MG/DL (ref 70–99)
HBA1C MFR BLD: 5.6 % (ref 0–5.6)
HDLC SERPL-MCNC: 63 MG/DL
LDLC SERPL CALC-MCNC: 101 MG/DL
NONHDLC SERPL-MCNC: 114 MG/DL
POTASSIUM SERPL-SCNC: 4.6 MMOL/L (ref 3.4–5.3)
PROT SERPL-MCNC: 6.8 G/DL (ref 6.8–8.8)
PSA SERPL-ACNC: 0.85 UG/L (ref 0–4)
SODIUM SERPL-SCNC: 141 MMOL/L (ref 133–144)
TRIGL SERPL-MCNC: 63 MG/DL

## 2020-12-28 PROCEDURE — 99396 PREV VISIT EST AGE 40-64: CPT | Performed by: FAMILY MEDICINE

## 2020-12-28 PROCEDURE — 80061 LIPID PANEL: CPT | Performed by: FAMILY MEDICINE

## 2020-12-28 PROCEDURE — 36415 COLL VENOUS BLD VENIPUNCTURE: CPT | Performed by: FAMILY MEDICINE

## 2020-12-28 PROCEDURE — G0103 PSA SCREENING: HCPCS | Performed by: FAMILY MEDICINE

## 2020-12-28 PROCEDURE — 99213 OFFICE O/P EST LOW 20 MIN: CPT | Mod: 25 | Performed by: FAMILY MEDICINE

## 2020-12-28 PROCEDURE — 83036 HEMOGLOBIN GLYCOSYLATED A1C: CPT | Performed by: FAMILY MEDICINE

## 2020-12-28 PROCEDURE — 80053 COMPREHEN METABOLIC PANEL: CPT | Performed by: FAMILY MEDICINE

## 2020-12-28 RX ORDER — ALBUTEROL SULFATE 90 UG/1
2 AEROSOL, METERED RESPIRATORY (INHALATION) EVERY 6 HOURS PRN
Qty: 1 INHALER | Refills: 11 | Status: SHIPPED | OUTPATIENT
Start: 2020-12-28

## 2020-12-28 RX ORDER — TAMSULOSIN HYDROCHLORIDE 0.4 MG/1
0.4 CAPSULE ORAL DAILY
Qty: 90 CAPSULE | Refills: 1 | Status: SHIPPED | OUTPATIENT
Start: 2020-12-28 | End: 2021-07-01

## 2020-12-28 RX ORDER — FLUTICASONE PROPIONATE 220 UG/1
2 AEROSOL, METERED RESPIRATORY (INHALATION) 2 TIMES DAILY
Qty: 3 INHALER | Refills: 6 | Status: SHIPPED | OUTPATIENT
Start: 2020-12-28 | End: 2022-04-01

## 2020-12-28 ASSESSMENT — ENCOUNTER SYMPTOMS
ABDOMINAL PAIN: 0
DIARRHEA: 0
HEMATOCHEZIA: 0
CONSTIPATION: 0
COUGH: 0
CHILLS: 0
HEMATURIA: 0

## 2020-12-28 ASSESSMENT — MIFFLIN-ST. JEOR: SCORE: 1763.89

## 2020-12-29 ENCOUNTER — TELEPHONE (OUTPATIENT)
Dept: FAMILY MEDICINE | Facility: CLINIC | Age: 55
End: 2020-12-29

## 2020-12-29 DIAGNOSIS — E78.5 HYPERLIPIDEMIA LDL GOAL <70: Primary | ICD-10-CM

## 2020-12-29 RX ORDER — ATORVASTATIN CALCIUM 40 MG/1
40 TABLET, FILM COATED ORAL DAILY
Qty: 90 TABLET | Refills: 3 | Status: SHIPPED | OUTPATIENT
Start: 2020-12-29 | End: 2022-04-01

## 2020-12-29 NOTE — RESULT ENCOUNTER NOTE
Please call patient:    Your prostate, diabetes, liver, and kidney tests are normal.     Your cholesterol is somewhat high and your risk for stroke can be lowered by taking a daily cholesterol lowering medication called atorvastatin 40 mg daily. I've sent a prescription for you. Possible side effects include muscle aches and liver problems. Return for lab only recheck in 6 to 8 weeks while fasting (nothing but water and medications for at least 8 hours.)  You may call [phone] or go to [email or web address].    Mary Ellen Mejia MD

## 2020-12-29 NOTE — TELEPHONE ENCOUNTER
[facility] [employer]- please call patient to give him referral info/scheduling number.    Please call patient to notify of [facility] referral.     Arturo Cagle MD    VASCULAR SURGERY REFERRAL  [facility]

## 2021-01-08 ENCOUNTER — OFFICE VISIT (OUTPATIENT)
Dept: VASCULAR SURGERY | Facility: CLINIC | Age: 56
End: 2021-01-08
Payer: COMMERCIAL

## 2021-01-08 DIAGNOSIS — I83.811 VARICOSE VEINS OF LEG WITH PAIN, RIGHT: Primary | ICD-10-CM

## 2021-01-08 DIAGNOSIS — I83.891 VARICOSE VEINS OF RIGHT LEG WITH EDEMA: ICD-10-CM

## 2021-01-08 PROCEDURE — 99203 OFFICE O/P NEW LOW 30 MIN: CPT | Performed by: SURGERY
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
## 2021-01-08 NOTE — PROGRESS NOTES
After Visit Follow Up  Writer offered compression hose order copy for patient to check with insurance on coverage before faxing in order to [facility]. Patient states to go ahead and fax to [facility]. His insurance covers 2 pairs last time.     Per pt request, faxed their compression hose Rx to [facility].   Pt would like 2 pair(s) of black, open-toe, knee high, 20-30mmhg compression hose. Awaiting response from [facility], confirming receipt of hose order.     Pt aware they will be shipped to their home address.    Per [facility], confirmed receipt of hose order.     Nga Ang RN
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
## 2021-01-12 ENCOUNTER — ANCILLARY PROCEDURE (OUTPATIENT)
Dept: ULTRASOUND IMAGING | Facility: CLINIC | Age: 56
End: 2021-01-12
Attending: SURGERY
Payer: COMMERCIAL

## 2021-01-12 DIAGNOSIS — I83.811 VARICOSE VEINS OF LEG WITH PAIN, RIGHT: ICD-10-CM

## 2021-01-12 PROCEDURE — 93971 EXTREMITY STUDY: CPT | Mod: RT | Performed by: SURGERY

## 2021-01-18 ENCOUNTER — VIRTUAL VISIT (OUTPATIENT)
Dept: VASCULAR SURGERY | Facility: CLINIC | Age: 56
End: 2021-01-18
Payer: COMMERCIAL

## 2021-01-18 DIAGNOSIS — I83.891 VARICOSE VEINS OF RIGHT LEG WITH EDEMA: ICD-10-CM

## 2021-01-18 DIAGNOSIS — I83.811 VARICOSE VEINS OF LEG WITH PAIN, RIGHT: Primary | ICD-10-CM

## 2021-01-18 PROCEDURE — 99213 OFFICE O/P EST LOW 20 MIN: CPT | Mod: 95 | Performed by: SURGERY

## 2021-01-18 NOTE — LETTER
1/18/2021         RE: Ceferino Crouch  [address] [address]        Dear Colleague,    Thank you for referring your patient, Ceferino Crouch, to the [facility] [place]. Please see a copy of my visit note below.    Ceferino is a 55 year old who is being evaluated via a billable video visit.      How would you like to obtain your AVS? MyChart  If the video visit is dropped, the invitation should be resent by: Text to cell phone: [phone]  Will anyone else be joining your video visit? No    Video Start Time: 9:09 AM          Video-Visit Details    Type of service:  Video Visit    Video End Time:9:29 AM    Originating Location (pt. Location): Home    Distant Location (provider location):  [facility] [place]     Platform used for Video Visit: [facility]     [facility] Note    Ceferino Crouch presents in follow-up of right lower extremity pain, swelling and varicose veins.  Please see my consultation of 1/8/2021 for details.  He returned on 1/12/2021 for a right lower extremity venous competency study, the results of which we will discuss on today's video visit.    Physical Exam  General: Pleasant male in no acute distress.  Blood pressure 131/78, pulse 67  Extremities: Right lower extremity: 4 to 6 mm varicose veins over the right medial calf extending down to just above the right ankle.  The great saphenous vein is palpably enlarged from near the mid thigh down to the knee.  Stasis hyperpigmentation is present over the right medial ankle.  2+ edema of the right ankle.  Left lower extremity: No significant varicose veins, edema or venous stasis changes.    Ultrasound:  Right lower extremity: No evidence of deep vein thrombosis.  His right common femoral vein is incompetent but the remaining deep vein valves are competent.  The right great saphenous vein is incompetent from the saphenofemoral junction to the knee, measures 7.7 mm in diameter  distally with reflux time of 6 seconds.  It is a source of multiple incompetent vein branches measuring up to 7.3 mm in diameter with reflux time of 6.5 seconds.  The right small saphenous vein is competent.  The right [facility] NEG accessory saphenous veins are not visualized.  There is a 3.2 mm diameter incompetent  14 cm above the medial malleolus communicating with an incompetent vein graft.    Assessment:  C3, VCSS 10 right lower extremity chronic venous insufficiency.  We discussed the option of continued conservative management with use of compression hose, leg elevation, dietary measures and exercise.  He has worn support hose for years working long hours on his feet.  The leg swells on a daily basis and his pain has been increasing, causing him to have to use Advil on an as-needed basis for his pain.  He does feel that his symptoms are limiting his daily activities because of the pain and the swelling.    Risks of conservative management including superficial thrombophlebitis, bleeding, progression of the disease process to cellulitis and ulceration were discussed.  With the stasis hyperpigmentation about his right medial ankle, he actually is at C4 level with his venous insufficiency.    If he chose treatment, he would be a candidate for endovenous ablation of the right great saphenous vein with or without phlebectomies.  Details of the procedure including risks of bleeding, infection, nerve injury, scarring, hyperpigmentation, deep vein thrombosis, recanalization of the great saphenous vein and recurrent varicose veins were discussed.  We also discussed activity limitations following the procedure.    Given the size of his varicosities and his advanced changes, I think he would be best served with concomitant phlebectomies-medically necessary.    Plan:  Radiofrequency ablation right great saphenous vein with medically necessary phlebectomies.    Regino Palacios MD    Dictated  using Dragon voice recognition software which may result in transcription errors            Again, thank you for allowing me to participate in the care of your patient.        Sincerely,        Regino Palacios MD

## 2021-01-18 NOTE — Clinical Note
Radiofrequency ablation right great saphenous vein with 10-20 medically necessary phlebectomies.  1-1/2 hours for procedure.  He has worn compression hose for years

## 2021-01-18 NOTE — PROGRESS NOTES
Ceferino is a 55 year old who is being evaluated via a billable video visit.      How would you like to obtain your AVS? MyChart  If the video visit is dropped, the invitation should be resent by: Text to cell phone: [phone]  Will anyone else be joining your video visit? No    Video Start Time: 9:09 AM          Video-Visit Details    Type of service:  Video Visit    Video End Time:9:29 AM    Originating Location (pt. Location): Home    Distant Location (provider location):  [facility] [place]     Platform used for Video Visit: [facility]     [facility] Clinic Note    Ceferino Crouch presents in follow-up of right lower extremity pain, swelling and varicose veins.  Please see my consultation of 1/8/2021 for details.  He returned on 1/12/2021 for a right lower extremity venous competency study, the results of which we will discuss on today's video visit.    Physical Exam  General: Pleasant male in no acute distress.  Blood pressure 131/78, pulse 67  Extremities: Right lower extremity: 4 to 6 mm varicose veins over the right medial calf extending down to just above the right ankle.  The great saphenous vein is palpably enlarged from near the mid thigh down to the knee.  Stasis hyperpigmentation is present over the right medial ankle.  2+ edema of the right ankle.  Left lower extremity: No significant varicose veins, edema or venous stasis changes.    Ultrasound:  Right lower extremity: No evidence of deep vein thrombosis.  His right common femoral vein is incompetent but the remaining deep vein valves are competent.  The right great saphenous vein is incompetent from the saphenofemoral junction to the knee, measures 7.7 mm in diameter distally with reflux time of 6 seconds.  It is a source of multiple incompetent vein branches measuring up to 7.3 mm in diameter with reflux time of 6.5 seconds.  The right small saphenous vein is competent.  The right Vein of [place] NEG accessory saphenous  veins are not visualized.  There is a 3.2 mm diameter incompetent  14 cm above the medial malleolus communicating with an incompetent vein graft.    Assessment:  C3, VCSS 10 right lower extremity chronic venous insufficiency.  We discussed the option of continued conservative management with use of compression hose, leg elevation, dietary measures and exercise.  He has worn support hose for years working long hours on his feet.  The leg swells on a daily basis and his pain has been increasing, causing him to have to use Advil on an as-needed basis for his pain.  He does feel that his symptoms are limiting his daily activities because of the pain and the swelling.    Risks of conservative management including superficial thrombophlebitis, bleeding, progression of the disease process to cellulitis and ulceration were discussed.  With the stasis hyperpigmentation about his right medial ankle, he actually is at C4 level with his venous insufficiency.    If he chose treatment, he would be a candidate for endovenous ablation of the right great saphenous vein with or without phlebectomies.  Details of the procedure including risks of bleeding, infection, nerve injury, scarring, hyperpigmentation, deep vein thrombosis, recanalization of the great saphenous vein and recurrent varicose veins were discussed.  We also discussed activity limitations following the procedure.    Given the size of his varicosities and his advanced changes, I think he would be best served with concomitant phlebectomies-medically necessary.    Plan:  Radiofrequency ablation right great saphenous vein with medically necessary phlebectomies.    Regino Palacios MD    Dictated using Dragon voice recognition software which may result in transcription errors

## 2021-03-01 DIAGNOSIS — I83.811 VARICOSE VEINS OF LEG WITH PAIN, RIGHT: Primary | ICD-10-CM

## 2021-03-12 ENCOUNTER — TRANSFERRED RECORDS (OUTPATIENT)
Dept: HEALTH INFORMATION MANAGEMENT | Facility: CLINIC | Age: 56
End: 2021-03-12

## 2021-03-26 ENCOUNTER — TELEPHONE (OUTPATIENT)
Dept: VASCULAR SURGERY | Facility: CLINIC | Age: 56
End: 2021-03-26

## 2021-03-26 NOTE — CONFIDENTIAL NOTE
Per Dr. Palacios, patient can continue 325 mg asa daily through his procedure. Not needed to stop prior to procedure due to medical history. Dr. Palacios will also prescribed 1 tablet, 0.1 mg clonidine and 2 tablets, 2 mg Ativan total for pre-procedural medications. Patient to encourage walking postop. Will provide the above information to patient at preop call.

## 2021-04-05 DIAGNOSIS — I83.891 VARICOSE VEINS OF RIGHT LEG WITH EDEMA: Primary | ICD-10-CM

## 2021-04-05 RX ORDER — LORAZEPAM 1 MG/1
TABLET ORAL
Qty: 2 TABLET | Refills: 0 | Status: SHIPPED | OUTPATIENT
Start: 2021-04-05 | End: 2021-04-16

## 2021-04-05 RX ORDER — CLONIDINE HYDROCHLORIDE 0.1 MG/1
TABLET ORAL
Qty: 1 TABLET | Refills: 0 | Status: SHIPPED | OUTPATIENT
Start: 2021-04-05 | End: 2021-04-16

## 2021-04-05 NOTE — CONFIDENTIAL NOTE
Vein Clinic Preoperative Nurse Call    Procedure: Right GSV Venaseal with phlebs  Date: 4/12/21  Surgeon: Dr. Palacios  Time: 0900  Check in time: 0800    Called and left detailed message. Informed patient: when to check in (0800) to sign consent, to bring their preop medications in their original bottle with them (2mg ativan, 0.1mg clonidine). Patient will take the medications after signing the consent to the procedure. Instructed patient to wear a mask and wear loose-fitting comfortable clothing. Ensured patient has a /someone that will be responsible for them the rest of the day. No visitors are allowed in the clinic, so  can either stay in the parking lot or leave. If  does leave, IF POSSIBLE, we ask that they do not go more than 15-20 mins from our clinic. Once procedure is completed, we will keep patient in recovery for 30-45 mins, and call  with aftercare instructions. Informed patient, that if possible, they should sit in the backseat to elevate their leg on the ride home.    Compression stocking not needed postop.     Special instructions: Patient to continue 325 mg asa daily per CPN.      Special COVID-19 instructions: Patient aware they need to wear a mask to our clinic and during their procedure. Patient aware that their  cannot come into the clinic and must wait in car. Nurse will call pt's  when procedure is over.    Patient understands that if they have any of the following symptoms (fever, cough, shortness of breath, rash), they need to notify us immediately to cancel their procedure and will have to reschedule for a later date.    Patient is in agreement with preoperative information and has no further questions.    Nga Ang RN  4/5/2021

## 2021-04-09 NOTE — CONFIDENTIAL NOTE
Called and left a voicemail of preop information below with patient. Writer will be in clinic until 4PM. Patient to call with questions.

## 2021-04-12 ENCOUNTER — OFFICE VISIT (OUTPATIENT)
Dept: VASCULAR SURGERY | Facility: CLINIC | Age: 56
End: 2021-04-12
Payer: COMMERCIAL

## 2021-04-12 VITALS — DIASTOLIC BLOOD PRESSURE: 71 MMHG | HEART RATE: 56 BPM | SYSTOLIC BLOOD PRESSURE: 114 MMHG | OXYGEN SATURATION: 96 %

## 2021-04-12 DIAGNOSIS — I83.891 VARICOSE VEINS OF RIGHT LEG WITH EDEMA: Primary | ICD-10-CM

## 2021-04-12 DIAGNOSIS — I83.811 VARICOSE VEINS OF RIGHT LOWER EXTREMITY WITH PAIN: ICD-10-CM

## 2021-04-12 DIAGNOSIS — I83.891 VARICOSE VEINS OF LEG WITH EDEMA, RIGHT: ICD-10-CM

## 2021-04-12 DIAGNOSIS — I83.811 VARICOSE VEINS OF LEG WITH PAIN, RIGHT: ICD-10-CM

## 2021-04-12 PROCEDURE — 36482 ENDOVEN THER CHEM ADHES 1ST: CPT | Mod: RT | Performed by: SURGERY

## 2021-04-12 PROCEDURE — 37765 STAB PHLEB VEINS XTR 10-20: CPT | Mod: RT | Performed by: SURGERY

## 2021-04-12 RX ORDER — HYDROCODONE BITARTRATE AND ACETAMINOPHEN 5; 325 MG/1; MG/1
1 TABLET ORAL EVERY 6 HOURS PRN
Qty: 2 TABLET | Refills: 0 | Status: SHIPPED | OUTPATIENT
Start: 2021-04-12 | End: 2021-04-16

## 2021-04-12 NOTE — PROGRESS NOTES
Vein Clinic Procedure Note    Indications:  Right leg pain, swelling and varicose veins with symptoms recalcitrant to conservative measures    Procedure:  1. Cyanoacrylate glue ablation right great saphenous vein (VenaSeal procedure)  2. Multiple stab phlebectomies right lower extremity    Surgeon  RK Palacios MD    Procedure Description  Details of the procedure including risks of bleeding, infection, nerve injury, scarring, hyperpigmentation, deep vein thrombosis, recanalization of the great saphenous vein, glue hypersensitivity reaction and recurrent varicose veins all discussed.  The patient voiced understanding and wished to proceed.  Informed consent was obtained.     I had the patient stand and marked varicosities coursing from just below his knee to his ankle with indelible marker.  We then proceeded the operating room, had the patient lie supine on the operating table, then prepped and draped the right lower extremity sterilely.    We took a timeout to confirm the appropriate operative site and procedure: Cyanoacrylate glue ablation of the right great saphenous vein with phlebectomies    VenaSeal closure  I imaged the right lower extremity easily identifying the right great saphenous vein.  The vein became tortuous just below the knee where clusters of varicosities coursed from it then was straighter but had varicosities coursing from it all the way down to the ankle level.  Therefore I felt that I needed to access the vein and close it from the ankle to the groin.  I infiltrated the skin overlying the vein near the level of the medial malleolus, placed a micropuncture needle into the vein followed by a micropuncture guidewire and a 7 Czech sheath.  Unfortunately I could only pass the delivery catheter to just below the knee and could not negotiate the area of tortuosity.  I therefore reaccessed the vein near the knee level placing a micropuncture guidewire into the vein.    We passed the VenaSeal  delivery catheter through the sheath, positioning of the tip of the device just below the knee and the great saphenous vein.  After confirming appropriate position, I applied compression to the great saphenous vein approximately 2 cm cephalad of the delivery catheter tip with the ultrasound probe, delivered 1 aliquot of glue, withdrew the catheter 3 cm, delivered a second aliquot of glue, then held compression for 30 seconds.  We treated the vein incrementally down to near the ankle level, withdrawing the introducer sheath slightly prior to delivering the last aliquot of glue.  We removed the delivery catheter, then remove the introducer sheath last.    I then placed a 7 Chadian sheath over the micropuncture guidewire near the level of the knee followed by passage of our VenaSeal delivery catheter to the saphenofemoral junction, then withdrew the catheter 5 cm from the saphenofemoral junction.  I identified the delivery catheter tip with the operating table in a neutral position, applied compression to occlude the vein 2 cm cephalad of the delivery catheter tip, delivered 1 aliquot of glue, withdrew the catheter 1 cm, delivered another aliquot of glue, withdrew the catheter 3 cm then held compression for 3 minutes.  Following 3 minutes of compression, I reidentified the delivery catheter tip, slid my probe 2 cm cephalad of the delivery catheter tip, delivered an aliquot of glue, withdrew the catheter 3 cm, delivered a second aliquot of glue, withdrew the catheter 3 cm then held compression for 30 seconds.  This latter sequence was repeated to just above the knee.  Once again, we partially withdrew the introducer sheath prior to delivering the last aliquot of glue.  We removed the delivery catheter and then lastly removed the introducer sheath.  I confirmed closure of the great saphenous vein essentially from the ankle to 5 cm from the saphenofemoral junction.  The saphenofemoral junction and common femoral veins were  fully compressible and free of thrombus.    Phlebectomies  We made stab wounds beside each of the marked varicosities with 11 scalpel, retrieved the veins with either vein hooks or shyanne hooks, clamped them with mosquito clamps and a avulsed them.  Hemostasis was secured with pressure.    After completing the phlebectomies, the leg was cleaned with saline solution and petroleum jelly was applied to the skin.  The leg was then dressed with ABD pads, cast padding and an Ace bandage from the toes to the groin.   We observed the patient for 30 minutes to ensure excellent hemostasis then took him to his car in a wheelchair.  Post procedure instructions were given to the patient and his  in verbal and written form.  They both voiced understanding and their questions were answered.    The patient tolerated the procedure well without evidence of allergic reaction or other complications and will return in 72 hours for a right lower extremity venous ultrasound.    Venaseal Closure    Date/Time: 4/12/2021 11:40 AM  Performed by: Regino Palacios MD  Authorized by: Regino Palacios MD     Time out: Immediately prior to the procedure a time out was called    Preparation: Patient was prepped and draped in usual sterile fashion    1st Assist:  Joya Moseley CST/MIKKI  Circulator:  Nga Ang RN  Procedure:  Venaseal  Procedure side:  Right  Vein(s) (VenaSeal):     One Vein    Vein Treated:  GSV  Patient tolerance:  Patient tolerated the procedure well with no immediate complications  Phlebectomy    Date/Time: 4/12/2021 11:40 AM  Performed by: Regino Palacios MD  Authorized by: Regino Palacios MD     Procedure:  Phlebectomies  Type:  Medically Necessary  Procedure side:  Right  Stabs:  10-20  Patient tolerance:  Patient tolerated the procedure well with no immediate complications  Wrap/Hose:  Wraps        Flowsheet Data 4/12/2021   Procedure Start Time:  9:26 AM   Prep:  Chloraprep   Side: Right   Tx Length (cm): Right Distal GSV: 20   Junction (cm): Right Distal GSV: None   How many additional vein treatments are being performed? 1   Tx Length (cm) - 2: Right Proximal GSV: 35   Junction 2 (cm): Right Proximal GSV: 5   # PHLEB Sites: Right le   Cyanoacrylate used (ml): Right Distal GSV: 0.6 mL, Right Proximal GSV: 1.2 mL   Sedation taken: Yes   Pre Pt. Physical / Cognitive Limitations: WNL   TOTAL Local anesthesia Injected (ml): 6.5 mL   Max Volume Local Anesthesia (ml): 11   TOTAL Tumescent Injected volume (ml): 144   Max Volume Tumescent (ml): 286   Post Pt. Physical / Cognitive Limitations: WNL   Procedure End Time: 10:39 AM   D/C Instructions given, states readiness to leave and escorted to car: Yes       LA NENA Palacios MD    Dictated using Dragon voice recognition software which may result in transcription errors    Pre-procedure Nursing Note    Ceferino GALILEO Crouch presents to clinic for Vein Procedure  .   /Person Responsible for Patient: Mariana (Ex-Wife)  Phone Number: [phone], ok to [facility].   Prophylactic Medication:N/A. Pt continues 325 mg asa daily, per Dr. Palacios request.  Sedation Medication: Ativan, 2 mg ,   Time Taken: 0801 and Clonidine, 0.1 mg,   Time Taken: 0801  Compression Stockings: Not needed.  The procedure is being performed on E.  Patient understanding of procedure matches consent? YES    Patient's pre-procedure medications verified by Nga Ang RN.    Nga Ang RN on 2021 at 7:58 AM

## 2021-04-12 NOTE — LETTER
4/12/2021         RE: Ceferino Crouch  [address] [address]        Dear Colleague,    Thank you for referring your patient, Ceferino Crouch, to the [facility] [place]. Please see a copy of my visit note below.        Vein Clinic Procedure Note    Indications:  Right leg pain, swelling and varicose veins with symptoms recalcitrant to conservative measures    Procedure:  1. Cyanoacrylate glue ablation right great saphenous vein (VenaSeal procedure)  2. Multiple stab phlebectomies right lower extremity    Surgeon  RK Palacios MD    Procedure Description  Details of the procedure including risks of bleeding, infection, nerve injury, scarring, hyperpigmentation, deep vein thrombosis, recanalization of the great saphenous vein, glue hypersensitivity reaction and recurrent varicose veins all discussed.  The patient voiced understanding and wished to proceed.  Informed consent was obtained.     I had the patient stand and marked varicosities coursing from just below his knee to his ankle with indelible marker.  We then proceeded the operating room, had the patient lie supine on the operating table, then prepped and draped the right lower extremity sterilely.    We took a timeout to confirm the appropriate operative site and procedure: Cyanoacrylate glue ablation of the right great saphenous vein with phlebectomies    VenaSeal closure  I imaged the right lower extremity easily identifying the right great saphenous vein.  The vein became tortuous just below the knee where clusters of varicosities coursed from it then was straighter but had varicosities coursing from it all the way down to the ankle level.  Therefore I felt that I needed to access the vein and close it from the ankle to the groin.  I infiltrated the skin overlying the vein near the level of the medial malleolus, placed a micropuncture needle into the vein followed by a micropuncture guidewire and a 7 Hungarian sheath.   Unfortunately I could only pass the delivery catheter to just below the knee and could not negotiate the area of tortuosity.  I therefore reaccessed the vein near the knee level placing a micropuncture guidewire into the vein.    We passed the VenaSeal delivery catheter through the sheath, positioning of the tip of the device just below the knee and the great saphenous vein.  After confirming appropriate position, I applied compression to the great saphenous vein approximately 2 cm cephalad of the delivery catheter tip with the ultrasound probe, delivered 1 aliquot of glue, withdrew the catheter 3 cm, delivered a second aliquot of glue, then held compression for 30 seconds.  We treated the vein incrementally down to near the ankle level, withdrawing the introducer sheath slightly prior to delivering the last aliquot of glue.  We removed the delivery catheter, then remove the introducer sheath last.    I then placed a 7 Gabonese sheath over the micropuncture guidewire near the level of the knee followed by passage of our VenaSeal delivery catheter to the saphenofemoral junction, then withdrew the catheter 5 cm from the saphenofemoral junction.  I identified the delivery catheter tip with the operating table in a neutral position, applied compression to occlude the vein 2 cm cephalad of the delivery catheter tip, delivered 1 aliquot of glue, withdrew the catheter 1 cm, delivered another aliquot of glue, withdrew the catheter 3 cm then held compression for 3 minutes.  Following 3 minutes of compression, I reidentified the delivery catheter tip, slid my probe 2 cm cephalad of the delivery catheter tip, delivered an aliquot of glue, withdrew the catheter 3 cm, delivered a second aliquot of glue, withdrew the catheter 3 cm then held compression for 30 seconds.  This latter sequence was repeated to just above the knee.  Once again, we partially withdrew the introducer sheath prior to delivering the last aliquot of glue.  We  removed the delivery catheter and then lastly removed the introducer sheath.  I confirmed closure of the great saphenous vein essentially from the ankle to 5 cm from the saphenofemoral junction.  The saphenofemoral junction and common femoral veins were fully compressible and free of thrombus.    Phlebectomies  We made stab wounds beside each of the marked varicosities with 11 scalpel, retrieved the veins with either vein hooks or shyanne hooks, clamped them with mosquito clamps and a avulsed them.  Hemostasis was secured with pressure.    After completing the phlebectomies, the leg was cleaned with saline solution and petroleum jelly was applied to the skin.  The leg was then dressed with ABD pads, cast padding and an Ace bandage from the toes to the groin.   We observed the patient for 30 minutes to ensure excellent hemostasis then took him to his car in a wheelchair.  Post procedure instructions were given to the patient and his  in verbal and written form.  They both voiced understanding and their questions were answered.    The patient tolerated the procedure well without evidence of allergic reaction or other complications and will return in 72 hours for a right lower extremity venous ultrasound.    Venaseal Closure    Date/Time: 4/12/2021 11:40 AM  Performed by: Regino Palacios MD  Authorized by: Regino Palacios MD     Time out: Immediately prior to the procedure a time out was called    Preparation: Patient was prepped and draped in usual sterile fashion    1st Assist:  Joya Moseley CST/MIKKI  Circulator:  Nga Ang RN  Procedure:  Venaseal  Procedure side:  Right  Vein(s) (VenaSeal):     One Vein    Vein Treated:  GSV  Patient tolerance:  Patient tolerated the procedure well with no immediate complications  Phlebectomy    Date/Time: 4/12/2021 11:40 AM  Performed by: Regino Palacios MD  Authorized by: Regino Palacios MD     Procedure:   Phlebectomies  Type:  Medically Necessary  Procedure side:  Right  Stabs:  10-20  Patient tolerance:  Patient tolerated the procedure well with no immediate complications  Wrap/Hose:  Wraps        Flowsheet Data 2021   Procedure Start Time:  9:26 AM   Prep: Chloraprep   Side: Right   Tx Length (cm): Right Distal GSV: 20   Junction (cm): Right Distal GSV: None   How many additional vein treatments are being performed? 1   Tx Length (cm) - 2: Right Proximal GSV: 35   Junction 2 (cm): Right Proximal GSV: 5   # PHLEB Sites: Right le   Cyanoacrylate used (ml): Right Distal GSV: 0.6 mL, Right Proximal GSV: 1.2 mL   Sedation taken: Yes   Pre Pt. Physical / Cognitive Limitations: WNL   TOTAL Local anesthesia Injected (ml): 6.5 mL   Max Volume Local Anesthesia (ml): 11   TOTAL Tumescent Injected volume (ml): 144   Max Volume Tumescent (ml): 286   Post Pt. Physical / Cognitive Limitations: WNL   Procedure End Time: 10:39 AM   D/C Instructions given, states readiness to leave and escorted to car: Yes       LA NENA Palacios MD    Dictated using Dragon voice recognition software which may result in transcription errors    Pre-procedure Nursing Note    Ceferino Crouch presents to clinic for Vein Procedure  .   /Person Responsible for Patient: Mariana (Ex-Wife)  Phone Number: [phone], ok to [facility].   Prophylactic Medication:N/A. Pt continues 325 mg asa daily, per Dr. Palacios request.  Sedation Medication: Ativan, 2 mg ,   Time Taken: 0801 and Clonidine, 0.1 mg,   Time Taken: 0801  Compression Stockings: Not needed.  The procedure is being performed on RLE.  Patient understanding of procedure matches consent? YES    Patient's pre-procedure medications verified by Nga Ang RN.    Nga Ang RN on 2021 at 7:58 AM      Again, thank you for allowing me to participate in the care of your patient.        Sincerely,        Regino Palacios MD

## 2021-04-12 NOTE — NURSING NOTE
Per Dr. Palacios, patient to get up at least 3-5 times per day postop and do 3-5 mins walk. Informed  of information and reviewed postop instructions. Acknowledges understanding.

## 2021-04-16 ENCOUNTER — ANCILLARY PROCEDURE (OUTPATIENT)
Dept: ULTRASOUND IMAGING | Facility: CLINIC | Age: 56
End: 2021-04-16
Attending: SURGERY
Payer: COMMERCIAL

## 2021-04-16 ENCOUNTER — TELEPHONE (OUTPATIENT)
Dept: VASCULAR SURGERY | Facility: CLINIC | Age: 56
End: 2021-04-16

## 2021-04-16 ENCOUNTER — OFFICE VISIT (OUTPATIENT)
Dept: VASCULAR SURGERY | Facility: CLINIC | Age: 56
End: 2021-04-16
Attending: SURGERY
Payer: COMMERCIAL

## 2021-04-16 DIAGNOSIS — I83.891 VARICOSE VEINS OF RIGHT LEG WITH EDEMA: Primary | ICD-10-CM

## 2021-04-16 DIAGNOSIS — I83.811 VARICOSE VEINS OF RIGHT LOWER EXTREMITY WITH PAIN: ICD-10-CM

## 2021-04-16 DIAGNOSIS — I83.811 VARICOSE VEINS OF LEG WITH PAIN, RIGHT: ICD-10-CM

## 2021-04-16 PROCEDURE — 99207 PR NO CHARGE NURSE ONLY: CPT

## 2021-04-16 PROCEDURE — 93971 EXTREMITY STUDY: CPT | Mod: RT | Performed by: RADIOLOGY

## 2021-04-16 RX ORDER — ASPIRIN 325 MG
325 TABLET ORAL DAILY
COMMUNITY

## 2021-04-16 NOTE — PROGRESS NOTES
Vein Clinic Postoperative Nurse Note    Patient is here for their Right leg VenaSeal GSV(med nec), 10-20 stab phlebs (med nec) on 4/12/21 w/ CPN postoperative visit.    Procedure: Right leg VenaSeal GSV(med nec), 10-20 stab phlebs (med nec)  Procedure Date: 4/12/21  Surgeon: Dr. Palacios    Ultrasound Result: Per ultrasound results, right GSV is closed 4.3 MM from the SFJ.    Physical Exam: Incisions are approximated without signs of infection.  Ecchymosis: Minimal  Swelling: Minmal  Paresthesia: Denies    Patient Questions or Concerns: Patient will be going on a road trip next Friday, 4/23/2021. Per Dr. Palacios, patient to wear at least knee high stocking on the trip. Patient to get out and walk every 1.5 hours, keep self hydrated during the trip, and do ankle pump. Patient can call clinic of his progress prior to his trip.     Patient states some tenderness at spots of phlebectomies in lower right leg, and upper thigh of GSV. Area is not discolored, no bumps noted. Informed patient this is part of healing, and will get better with time.      Reviewed postoperative instructions with patient and provided them with written material of common things to expect from their procedure.     Patient's Next Vein Clinic Appointment: Scheduled patient for 6 weeks postop. Informed patient he will also be getting another follow up with ultrasound between 3 months to a year from his procedure. Acknowledges understanding.     Nga Ang RN

## 2021-04-16 NOTE — TELEPHONE ENCOUNTER
Called and clarified to patient, he does not need thigh high compression for 7 days due to Venaseal. Patient said he got thigh high in the mail, requested knee high instead. Per [facility], sent out the incorrect hose. Patient is ok to keep his thigh high.     After Visit Follow Up  Per pt request, faxed their compression hose Rx to [facility].   Pt would like 1 pair(s) of black, open-toe, knee high, 20-30mmhg compression hose. Waiting from [facility] to respond back of order received.     Pt aware they will be shipped to their home address. Inform patient to check box for correct hose before opening. Acknowledges understanding.    Nga Ang RN

## 2021-04-16 NOTE — LETTER
4/16/2021         RE: Ceferino Crouch  [address], [place] [address]        Dear Colleague,    Thank you for referring your patient, Ceferino Crouch, to the [facility]IN [facility]IN[facility] [place]. Please see a copy of my visit note below.        Vein Clinic Postoperative Nurse Note    Patient is here for their Right leg VenaSeal GSV(med nec), 10-20 stab phlebs (med nec) on 4/12/21 w/ CPN postoperative visit.    Procedure: Right leg VenaSeal GSV(med nec), 10-20 stab phlebs (med nec)  Procedure Date: 4/12/21  Surgeon: Dr. Palacios    Ultrasound Result: Per ultrasound results, right GSV is closed 4.3 MM from the SFJ.    Physical Exam: Incisions are approximated without signs of infection.  Ecchymosis: Minimal  Swelling: Minmal  Paresthesia: Denies    Patient Questions or Concerns: Patient will be going on a road trip next Friday, 4/23/2021. Per Dr. Palacios, patient to wear at least knee high stocking on the trip. Patient to get out and walk every 1.5 hours, keep self hydrated during the trip, and do ankle pump. Patient can call clinic of his progress prior to his trip.     Patient states some tenderness at spots of phlebectomies in lower right leg, and upper thigh of GSV. Area is not discolored, no bumps noted. Informed patient this is part of healing, and will get better with time.      Reviewed postoperative instructions with patient and provided them with written material of common things to expect from their procedure.     Patient's Next Vein Clinic Appointment: Scheduled patient for 6 weeks postop. Informed patient he will also be getting another follow up with ultrasound between 3 months to a year from his procedure. Acknowledges understanding.     Nga Ang RN      Again, thank you for allowing me to participate in the care of your patient.        Sincerely,         VEIN NURSE

## 2021-05-28 ENCOUNTER — VIRTUAL VISIT (OUTPATIENT)
Dept: VASCULAR SURGERY | Facility: CLINIC | Age: 56
End: 2021-05-28
Payer: COMMERCIAL

## 2021-05-28 DIAGNOSIS — I83.891 VARICOSE VEINS OF RIGHT LEG WITH EDEMA: ICD-10-CM

## 2021-05-28 DIAGNOSIS — I83.811 VARICOSE VEINS OF RIGHT LOWER EXTREMITY WITH PAIN: Primary | ICD-10-CM

## 2021-05-28 PROCEDURE — 99207 PR VEINSOLUTIONS POST OPERATIVE VISIT: CPT | Performed by: SURGERY

## 2021-05-28 NOTE — PROGRESS NOTES
Ceferino is a 55 year old who is being evaluated via a billable video visit.      How would you like to obtain your AVS? MyChart  If the video visit is dropped, the invitation should be resent by: Text to cell phone: [phone]  Will anyone else be joining your video visit? No      Video Start Time: 8:22 AM    Video-Visit Details    Type of service:  Video Visit    Video End Time:8:34 AM    Originating Location (pt. Location): Other Work    Distant Location (provider location):  [facility] VEIN CLIN[facility] [place]     Platform used for Video Visit: MAZ    [facility] [place] 6-week postop visit  Ceferino Crouch is seen 6 weeks postop from right great saphenous vein cyanoacrylate glue ablation with phlebectomies.  Overall he says his leg pain is much improved he has noticed his leg swelling.  He does admit to some mild swelling toward the end of the day as he spends more than 10 hours on his feet daily at work.  He continues to wear knee-high compression to control the edema.    On physical exam there are no visible varicose veins.  The corona phlebectatica about his right medial ankle is much improved.  Visualization reveals very little edema of his right ankle.    Is post VenaSeal ultrasound revealed a closed great saphenous vein 5 mm from the saphenofemoral junction to the ankle with no evidence of deep vein thrombosis.    Impression  Good result 6-week status post cyanoacrylate glue ablation of the right great saphenous vein with phlebectomies.  His leg pain and swelling are much improved and he is quite happy.    I informed him that the induration along the medial thigh will take 6 months to a year to heal and that as long as there is some induration remaining (part of the healing process) he may continue to have some swelling of the ankle.  The swelling may never mable completely but will continue to improve as the induration improves.    Plan  He will return for a long-term VenaSeal right  lower extremity ultrasound in November or December of this year.    ANGELA Palacios.    Dictated using Dragon voice recognition software which may result in transcription errors

## 2021-05-28 NOTE — LETTER
5/28/2021         RE: Ceferino Crouch  [address] [address]        Dear Colleague,    Thank you for referring your patient, Ceferino Crouch, to the [facility] VEIN [facility]. Please see a copy of my visit note below.    Ceferino is a 55 year old who is being evaluated via a billable video visit.      How would you like to obtain your AVS? MyChart  If the video visit is dropped, the invitation should be resent by: Text to cell phone: [phone]  Will anyone else be joining your video visit? No      Video Start Time: 8:22 AM    Video-Visit Details    Type of service:  Video Visit    Video End Time:8:34 AM    Originating Location (pt. Location): Other Work    Distant Location (provider location):  [facility] VE[facility]     Platform used for Video Visit: [employer]    [facility] Vein [facility] 6-week postop visit  Ceferino Crouch is seen 6 weeks postop from right great saphenous vein cyanoacrylate glue ablation with phlebectomies.  Overall he says his leg pain is much improved he has noticed his leg swelling.  He does admit to some mild swelling toward the end of the day as he spends more than 10 hours on his feet daily at work.  He continues to wear knee-high compression to control the edema.    On physical exam there are no visible varicose veins.  The corona phlebectatica about his right medial ankle is much improved.  Visualization reveals very little edema of his right ankle.    Is post VenaSeal ultrasound revealed a closed great saphenous vein 5 mm from the saphenofemoral junction to the ankle with no evidence of deep vein thrombosis.    Impression  Good result 6-week status post cyanoacrylate glue ablation of the right great saphenous vein with phlebectomies.  His leg pain and swelling are much improved and he is quite happy.    I informed him that the induration along the medial thigh will take 6 months to a year to heal and that as long as there is some  induration remaining (part of the healing process) he may continue to have some swelling of the ankle.  The swelling may never mable completely but will continue to improve as the induration improves.    Plan  He will return for a long-term VenaSeal right lower extremity ultrasound in November or December of this year.    ANGELA Palacios.    Dictated using Dragon voice recognition software which may result in transcription errors      Again, thank you for allowing me to participate in the care of your patient.        Sincerely,        Regino Palacios MD

## 2021-06-30 DIAGNOSIS — R39.11 URINARY HESITANCY: ICD-10-CM

## 2021-06-30 NOTE — TELEPHONE ENCOUNTER
Routing refill request to provider for review/approval because:    Patient has Tadalafil, Vardenafil, or Sildenafil on their medication list          Pending Prescriptions:                       Disp   Refills    tamsulosin (FLOMAX) 0.4 MG capsule [Pharma*90 cap*1        Sig: TAKE 1 CAPSULE BY MOUTH EVERY DAY        Kye Chacon RN

## 2021-07-01 RX ORDER — TAMSULOSIN HYDROCHLORIDE 0.4 MG/1
CAPSULE ORAL
Qty: 90 CAPSULE | Refills: 1 | Status: SHIPPED | OUTPATIENT
Start: 2021-07-01 | End: 2021-07-29

## 2021-07-29 DIAGNOSIS — R39.11 URINARY HESITANCY: ICD-10-CM

## 2021-07-29 NOTE — TELEPHONE ENCOUNTER
Patient needs a refill on tamsulosin (FLOMAX) 0.4 MG capsule sent to [id number] - [place], [place] - [phone].Please call patient when this has been done.  Suyapa Young,

## 2021-08-03 RX ORDER — TAMSULOSIN HYDROCHLORIDE 0.4 MG/1
CAPSULE ORAL
Qty: 90 CAPSULE | Refills: 1 | Status: SHIPPED | OUTPATIENT
Start: 2021-08-03 | End: 2022-01-28

## 2021-08-12 ENCOUNTER — OFFICE VISIT (OUTPATIENT)
Dept: FAMILY MEDICINE | Facility: CLINIC | Age: 56
End: 2021-08-12
Payer: COMMERCIAL

## 2021-08-12 VITALS
OXYGEN SATURATION: 97 % | HEART RATE: 62 BPM | WEIGHT: 189 LBS | BODY MASS INDEX: 25.35 KG/M2 | DIASTOLIC BLOOD PRESSURE: 76 MMHG | TEMPERATURE: 97.5 F | SYSTOLIC BLOOD PRESSURE: 116 MMHG

## 2021-08-12 DIAGNOSIS — E55.9 VITAMIN D DEFICIENCY: ICD-10-CM

## 2021-08-12 DIAGNOSIS — K59.00 CONSTIPATION, UNSPECIFIED CONSTIPATION TYPE: ICD-10-CM

## 2021-08-12 DIAGNOSIS — R10.84 ABDOMINAL PAIN, GENERALIZED: Primary | ICD-10-CM

## 2021-08-12 PROCEDURE — 99214 OFFICE O/P EST MOD 30 MIN: CPT | Performed by: FAMILY MEDICINE

## 2021-08-12 RX ORDER — ERGOCALCIFEROL 1.25 MG/1
50000 CAPSULE, LIQUID FILLED ORAL WEEKLY
Qty: 12 CAPSULE | Refills: 0 | Status: SHIPPED | OUTPATIENT
Start: 2021-08-12 | End: 2021-08-12

## 2021-08-12 ASSESSMENT — ASTHMA QUESTIONNAIRES
QUESTION_4 LAST FOUR WEEKS HOW OFTEN HAVE YOU USED YOUR RESCUE INHALER OR NEBULIZER MEDICATION (SUCH AS ALBUTEROL): ONCE A WEEK OR LESS
ACT_TOTALSCORE: 22
QUESTION_2 LAST FOUR WEEKS HOW OFTEN HAVE YOU HAD SHORTNESS OF BREATH: ONCE OR TWICE A WEEK
QUESTION_3 LAST FOUR WEEKS HOW OFTEN DID YOUR ASTHMA SYMPTOMS (WHEEZING, COUGHING, SHORTNESS OF BREATH, CHEST TIGHTNESS OR PAIN) WAKE YOU UP AT NIGHT OR EARLIER THAN USUAL IN THE MORNING: NOT AT ALL
QUESTION_5 LAST FOUR WEEKS HOW WOULD YOU RATE YOUR ASTHMA CONTROL: WELL CONTROLLED
QUESTION_1 LAST FOUR WEEKS HOW MUCH OF THE TIME DID YOUR ASTHMA KEEP YOU FROM GETTING AS MUCH DONE AT WORK, SCHOOL OR AT HOME: NONE OF THE TIME

## 2021-08-12 NOTE — PROGRESS NOTES
Assessment & Plan       ICD-10-CM    1. Abdominal pain, generalized  R10.84    2. Constipation, unspecified constipation type  K59.00    3. Vitamin D deficiency  E55.9 DISCONTINUED: vitamin D2 (ERGOCALCIFEROL) 27823 units (1250 mcg) capsule         Review of external notes as documented elsewhere in note      Patient Instructions   Ceferino    It was a pleasure seeing you in clinic today.  Here's the plan:    1. Abdominal pain - likely constipation given your symptoms.  Start with 2 tablespoons of metamucil in a class of water 2 times per day for the first 2 days, then once daily for the rest of the week.  Drink about 70oz of water per day, get regular exercise.  Let me know if metamucil doesn't help and we'll try other treatment.  If abdominal pain persists and stool is normal, we'll do imaging.    Let me know if you have questions.    Arturo Cagle MD    Patient Education     Treating Constipation  Constipation is a common and often uncomfortable problem. Constipation means you have bowel movements fewer than 3 times per week. Or that you strain to pass hard, dry stool. It can last a short time. Or it can be a problem that never seems to go away. The good news is that it can often be treated and controlled.   Eat more fiber  One of the best ways to help treat constipation is to increase your fiber intake. You can do this either through diet or by using fiber supplements. Fiber (in whole grains, fruits, and vegetables) adds bulk and absorbs water to soften the stool. This helps the stool pass through the colon more easily. When you increase your fiber intake, do it slowly to prevent side effects such as bloating. Also increase the amount of water that you drink. Eating more of these foods can add fiber to your diet:     High-fiber cereals    Whole grains, bran, and brown rice    Vegetables such as carrots, broccoli, and greens    Fresh fruits (especially apples, pears, and dried fruits such as raisins and  apricots)    Nuts and legumes (especially beans such as lentils, kidney beans, and lima beans)  Get physically active  Exercise helps improve the working of your colon which helps ease constipation. Try to get some physical activity every day. If you haven t been active for a while, talk with your healthcare provider before starting again.     Laxatives  Your healthcare provider may suggest an over-the-counter product to help ease your constipation. He or she may suggest the use of bulk-forming agents or laxatives. Laxatives, if used as directed, are common and safe. Follow directions carefully when using them. See your provider for new-onset constipation, or long-term constipation, to rule out other causes such as medicines or thyroid disease.   [employer] last reviewed this educational content on 6/1/2019 2000-2021 [employer]. All rights reserved. This information is not intended as a substitute for professional medical care. Always follow your healthcare professional's instructions.           Patient Education     Eating a High-Fiber Diet  Fiber is what gives strength and structure to plants. Most grains, beans, vegetables, and fruits contain fiber. Foods rich in fiber are often low in calories and fat, and they fill you up more. They may also reduce your risks for certain health problems. To find out the amount of fiber in canned, packaged, or frozen foods, read the Nutrition Facts label. It tells you how much fiber is in one serving.    Types of fiber and their benefits  There are two types of fiber: insoluble and soluble. They both aid digestion and help you maintain a healthy weight.    Insoluble fiber. This is found in whole grains, cereals, certain fruits and vegetables such as apple skin, corn, and carrots. Insoluble fiber may prevent constipation and reduce the risk for certain types of cancer. It's called insoluble because it doesn't dissolve in water.    Soluble fiber. This type of fiber  is in oats, beans, and certain fruits and vegetables such as strawberries and peas. Soluble fiber can reduce cholesterol, which may help lower the risk for heart disease. It also helps control blood sugar levels.  Look for high-fiber foods  Try these foods to add fiber to your diet:    Whole-grain breads and cereals. Try to eat 6 to 8 ounces a day. Include wheat and oat bran cereals, whole-wheat muffins or toast, and corn tortillas in your meals.    Fruits. Try to eat 2 cups a day. Apples, oranges, strawberries, pears, and bananas are good sources. (Note: Fruit juice is low in fiber.)    Vegetables. Try to eat at least 2.5 cups a day. Add asparagus, carrots, broccoli, peas, and corn to your meals.    Beans. One cup of cooked lentils gives you over 15 grams of fiber. Try navy beans, lentils, and chickpeas.    Seeds. A small handful of seeds gives you about 3 grams of fiber. Try sunflower or noreen seeds.  Keep track of your fiber  Keep track of how much fiber you eat. Start by reading food labels. Then eat a variety of foods high in fiber. As you start to eat more fiber, ask your healthcare provider how much water you should be drinking to keep your digestive system working smoothly.  Aim for a certain amount of fiber in your diet each day. The daily value for fiber is 25 grams a day. But some experts advise that women under 50 eat 25 to 28 grams per day and that men under 50 eat 30 to 38 grams per day. After age 50, your daily fiber needs drop to 22 grams for women and 28 grams for men.  Before you reach for the fiber supplements, think about this. Fiber is found naturally in healthy whole foods. It gives you that feeling of fullness after you eat. Taking fiber supplements or eating fiber-enriched foods will not give you this full feeling.  Your fiber intake is a good measure for the quality of your overall diet. If you are missing out on your daily amount of fiber, you may be lacking other important nutrients as  well.  [employer] last reviewed this educational content on 7/1/2019 2000-2021 [employer]. All rights reserved. This information is not intended as a substitute for professional medical care. Always follow your healthcare professional's instructions.               Return in about 2 weeks (around 8/26/2021) for For Re-Assessment, If symptoms persist.    Arturo Cagle MD  [facility] JAYDEN De La Vega is a 56 year old who presents for the following health issues     HPI     Abdominal/Flank Pain  Onset/Duration: 3 weeks  Description:   Character: Dull ache  Location: chaz-umbilical region  Radiation: None  Intensity: mild, moderate  Progression of Symptoms:  same  Accompanying Signs & Symptoms:  Fever/Chills: no  Gas/Bloating: YES  Nausea: no  Vomitting: no  Diarrhea: no  Constipation: YES  Dysuria or Hematuria: no  History:   Trauma: no  Previous similar pain: no  Previous tests done: none  Precipitating factors:   Does the pain change with:     Food: no    Bowel Movement: no    Urination: no   Other factors:  no  Therapies tried and outcome: none    generalized abdominal discomfort  Gassy  Infrequent, hard stool  No treatments tried      Review of Systems   Constitutional, HEENT, cardiovascular, pulmonary, gi and gu systems are negative, except as otherwise noted.      Objective    /76   Pulse 62   Temp 97.5  F (36.4  C) (Oral)   Wt 85.7 kg (189 lb)   SpO2 97%   BMI 25.35 kg/m    Body mass index is 25.35 kg/m .  Physical Exam   GENERAL: healthy, alert and no distress  EYES: Eyes grossly normal to inspection, PERRL and conjunctivae and sclerae normal  NECK: no adenopathy, no asymmetry, masses, or scars and thyroid normal to palpation  ABDOMEN: soft, nontender, no hepatosplenomegaly, no masses and bowel sounds normal  SKIN: no suspicious lesions or rashes  PSYCH: mentation appears normal, affect normal/bright

## 2021-08-12 NOTE — PATIENT INSTRUCTIONS
Ceferino    It was a pleasure seeing you in clinic today.  Here's the plan:    1. Abdominal pain - likely constipation given your symptoms.  Start with 2 tablespoons of metamucil in a class of water 2 times per day for the first 2 days, then once daily for the rest of the week.  Drink about 70oz of water per day, get regular exercise.  Let me know if metamucil doesn't help and we'll try other treatment.  If abdominal pain persists and stool is normal, we'll do imaging.    Let me know if you have questions.    Arturo Cagle MD    Patient Education     Treating Constipation  Constipation is a common and often uncomfortable problem. Constipation means you have bowel movements fewer than 3 times per week. Or that you strain to pass hard, dry stool. It can last a short time. Or it can be a problem that never seems to go away. The good news is that it can often be treated and controlled.   Eat more fiber  One of the best ways to help treat constipation is to increase your fiber intake. You can do this either through diet or by using fiber supplements. Fiber (in whole grains, fruits, and vegetables) adds bulk and absorbs water to soften the stool. This helps the stool pass through the colon more easily. When you increase your fiber intake, do it slowly to prevent side effects such as bloating. Also increase the amount of water that you drink. Eating more of these foods can add fiber to your diet:     High-fiber cereals    Whole grains, bran, and brown rice    Vegetables such as carrots, broccoli, and greens    Fresh fruits (especially apples, pears, and dried fruits such as raisins and apricots)    Nuts and legumes (especially beans such as lentils, kidney beans, and lima beans)  Get physically active  Exercise helps improve the working of your colon which helps ease constipation. Try to get some physical activity every day. If you haven t been active for a while, talk with your healthcare provider before starting again.      Laxatives  Your healthcare provider may suggest an over-the-counter product to help ease your constipation. He or she may suggest the use of bulk-forming agents or laxatives. Laxatives, if used as directed, are common and safe. Follow directions carefully when using them. See your provider for new-onset constipation, or long-term constipation, to rule out other causes such as medicines or thyroid disease.   [employer] last reviewed this educational content on 6/1/2019 2000-2021 [employer]. All rights reserved. This information is not intended as a substitute for professional medical care. Always follow your healthcare professional's instructions.           Patient Education     Eating a High-Fiber Diet  Fiber is what gives strength and structure to plants. Most grains, beans, vegetables, and fruits contain fiber. Foods rich in fiber are often low in calories and fat, and they fill you up more. They may also reduce your risks for certain health problems. To find out the amount of fiber in canned, packaged, or frozen foods, read the Nutrition Facts label. It tells you how much fiber is in one serving.    Types of fiber and their benefits  There are two types of fiber: insoluble and soluble. They both aid digestion and help you maintain a healthy weight.    Insoluble fiber. This is found in whole grains, cereals, certain fruits and vegetables such as apple skin, corn, and carrots. Insoluble fiber may prevent constipation and reduce the risk for certain types of cancer. It's called insoluble because it doesn't dissolve in water.    Soluble fiber. This type of fiber is in oats, beans, and certain fruits and vegetables such as strawberries and peas. Soluble fiber can reduce cholesterol, which may help lower the risk for heart disease. It also helps control blood sugar levels.  Look for high-fiber foods  Try these foods to add fiber to your diet:    Whole-grain breads and cereals. Try to eat 6 to 8  ounces a day. Include wheat and oat bran cereals, whole-wheat muffins or toast, and corn tortillas in your meals.    Fruits. Try to eat 2 cups a day. Apples, oranges, strawberries, pears, and bananas are good sources. (Note: Fruit juice is low in fiber.)    Vegetables. Try to eat at least 2.5 cups a day. Add asparagus, carrots, broccoli, peas, and corn to your meals.    Beans. One cup of cooked lentils gives you over 15 grams of fiber. Try navy beans, lentils, and chickpeas.    Seeds. A small handful of seeds gives you about 3 grams of fiber. Try sunflower or noreen seeds.  Keep track of your fiber  Keep track of how much fiber you eat. Start by reading food labels. Then eat a variety of foods high in fiber. As you start to eat more fiber, ask your healthcare provider how much water you should be drinking to keep your digestive system working smoothly.  Aim for a certain amount of fiber in your diet each day. The daily value for fiber is 25 grams a day. But some experts advise that women under 50 eat 25 to 28 grams per day and that men under 50 eat 30 to 38 grams per day. After age 50, your daily fiber needs drop to 22 grams for women and 28 grams for men.  Before you reach for the fiber supplements, think about this. Fiber is found naturally in healthy whole foods. It gives you that feeling of fullness after you eat. Taking fiber supplements or eating fiber-enriched foods will not give you this full feeling.  Your fiber intake is a good measure for the quality of your overall diet. If you are missing out on your daily amount of fiber, you may be lacking other important nutrients as well.  [employer] last reviewed this educational content on 7/1/2019 2000-2021 [employer]. All rights reserved. This information is not intended as a substitute for professional medical care. Always follow your healthcare professional's instructions.

## 2021-08-13 ASSESSMENT — ASTHMA QUESTIONNAIRES: ACT_TOTALSCORE: 22

## 2021-08-18 ENCOUNTER — TELEPHONE (OUTPATIENT)
Dept: FAMILY MEDICINE | Facility: CLINIC | Age: 56
End: 2021-08-18

## 2021-08-18 DIAGNOSIS — K59.00 CONSTIPATION, UNSPECIFIED CONSTIPATION TYPE: Primary | ICD-10-CM

## 2021-08-18 DIAGNOSIS — R10.84 ABDOMINAL PAIN, GENERALIZED: Primary | ICD-10-CM

## 2021-08-18 RX ORDER — POLYETHYLENE GLYCOL 3350 17 G/17G
17 POWDER, FOR SOLUTION ORAL DAILY
Qty: 510 G | Refills: 0 | Status: SHIPPED | OUTPATIENT
Start: 2021-08-18 | End: 2022-12-23

## 2021-08-18 NOTE — TELEPHONE ENCOUNTER
"Routing to PCP- see phone call    Pt was last seen 8/12/21 for abdominal pain. Pt states the pain has not improved. Per providers last note \"Let me know if metamucil doesn't help and we'll try other treatment.  If abdominal pain persists and stool is normal, we'll do imaging.\"    Would you like to order imaging?    Pt would like to be called back at [phone] with providers recommendations    Kylah Hardwick RN    "

## 2021-08-18 NOTE — TELEPHONE ENCOUNTER
Spoke with patient. He is no longer having problems with constipation. His stools have been more frequent. He stopped taking metamucil Sat and Sun due to diarrhea. He has restarted Metamucil and now has floating stools. He is still burrping, is gassy, and has same abdominal pain. Please advise.

## 2021-08-24 NOTE — TELEPHONE ENCOUNTER
Left message on answering machine for patient to call back to the nurse at [phone].    Ginger Khoury RN  [facility]

## 2021-08-24 NOTE — TELEPHONE ENCOUNTER
Received call back from patient. Notified of provider message as written. Patient verbalized good understanding and was provided with scheduling numbers.    Sonya LAINEZ, RN  [facility]

## 2021-08-24 NOTE — TELEPHONE ENCOUNTER
Arturo Coffey MD  Fz Rn Triage Pool 41 minutes ago (2:40 PM)   MM  CT abdomen and referral to GI ordered      Routing comment

## 2021-08-26 ENCOUNTER — ANCILLARY PROCEDURE (OUTPATIENT)
Dept: CT IMAGING | Facility: CLINIC | Age: 56
End: 2021-08-26
Attending: FAMILY MEDICINE
Payer: COMMERCIAL

## 2021-08-26 DIAGNOSIS — R10.84 ABDOMINAL PAIN, GENERALIZED: ICD-10-CM

## 2021-08-26 PROCEDURE — 74176 CT ABD & PELVIS W/O CONTRAST: CPT | Mod: TC | Performed by: RADIOLOGY

## 2021-08-31 DIAGNOSIS — N28.1 RENAL CYST, NATIVE, HEMORRHAGE: Primary | ICD-10-CM

## 2021-08-31 DIAGNOSIS — N28.89 RENAL CYST, NATIVE, HEMORRHAGE: Primary | ICD-10-CM

## 2021-09-01 ENCOUNTER — TELEPHONE (OUTPATIENT)
Dept: FAMILY MEDICINE | Facility: CLINIC | Age: 56
End: 2021-09-01

## 2021-09-01 NOTE — TELEPHONE ENCOUNTER
Arturo Cagle MD   8/31/2021  7:46 AM CDT Back to Top      *Please call patient with result     Ceferino      Your CT scan shows moderate constipation.  It also showed kidney cysts.  The radiologist recommends getting an MRI with contrast to assess this better.  These cysts would not necessarily cause your symptoms however.     Arturo Cagle MD

## 2021-09-01 NOTE — TELEPHONE ENCOUNTER
Left message on answering machine for patient to call back to the nurse at [phone].    Ginger Khoury RN  [facility]

## 2021-09-01 NOTE — TELEPHONE ENCOUNTER
Patient called the clinic inquiring about the CT scan results completed 8/26/21.  Patient was informed of the provider's recommendations.  Provided contact information for imaging [phone] for patient to schedule the MRI as recommended.  Patient verbalized understanding and has no further questions or concerns at this time.        Arturo Cagle MD   8/31/2021  7:46 AM CDT       *Please call patient with result     Ceferino      Your CT scan shows moderate constipation.  It also showed kidney cysts.  The radiologist recommends getting an MRI with contrast to assess this better.  These cysts would not necessarily cause your symptoms however.     Arturo Cagle MD

## 2021-09-02 NOTE — TELEPHONE ENCOUNTER
Patient's results relayed to patient in separate encounter.     Linda Rudolph, RN, BSN, PHN  [facility]: [facility]

## 2021-09-08 ENCOUNTER — ANCILLARY PROCEDURE (OUTPATIENT)
Dept: MRI IMAGING | Facility: CLINIC | Age: 56
End: 2021-09-08
Attending: FAMILY MEDICINE
Payer: COMMERCIAL

## 2021-09-08 DIAGNOSIS — N28.89 RENAL CYST, NATIVE, HEMORRHAGE: ICD-10-CM

## 2021-09-08 DIAGNOSIS — N28.1 RENAL CYST, NATIVE, HEMORRHAGE: ICD-10-CM

## 2021-09-08 PROCEDURE — 74183 MRI ABD W/O CNTR FLWD CNTR: CPT | Mod: TC | Performed by: RADIOLOGY

## 2021-09-08 PROCEDURE — A9585 GADOBUTROL INJECTION: HCPCS | Performed by: RADIOLOGY

## 2021-09-08 RX ORDER — GADOBUTROL 604.72 MG/ML
10 INJECTION INTRAVENOUS ONCE
Status: COMPLETED | OUTPATIENT
Start: 2021-09-08 | End: 2021-09-08

## 2021-09-08 RX ADMIN — GADOBUTROL 8.5 ML: 604.72 INJECTION INTRAVENOUS at 08:40

## 2021-09-16 ENCOUNTER — VIRTUAL VISIT (OUTPATIENT)
Dept: FAMILY MEDICINE | Facility: CLINIC | Age: 56
End: 2021-09-16
Payer: COMMERCIAL

## 2021-09-16 DIAGNOSIS — K59.00 CONSTIPATION, UNSPECIFIED CONSTIPATION TYPE: Primary | ICD-10-CM

## 2021-09-16 DIAGNOSIS — N28.1 KIDNEY CYSTS: ICD-10-CM

## 2021-09-16 PROCEDURE — 99213 OFFICE O/P EST LOW 20 MIN: CPT | Mod: 95 | Performed by: FAMILY MEDICINE

## 2021-09-16 NOTE — PROGRESS NOTES
Ceferino is a 56 year old who is being evaluated via a billable telephone visit.      What phone number would you like to be contacted at? [phone]  How would you like to obtain your AVS? Mail a copy    Assessment & Plan       ICD-10-CM    1. Constipation, unspecified constipation type  K59.00    2. Kidney cysts  N28.1      Continue high fiber diet, miralax/metamucil as needed  Discussed MRI results, no further workup needed    Review of external notes as documented elsewhere in note        Return in about 3 months (around 12/16/2021) for If symptoms persist.    Arturo Cagle MD  [facility] JAYDEN De La Vega is a 56 year old who presents for the following health issues     HPI     Chief Complaint   Patient presents with     Results     RECHECK     Abdominal pain     MRI: 9/8/2021    Initial presentation with abdominal pain  Stool infrequency  Treated for constipation, symptoms have improved    Imaging showed cystic lesions on kidnies  MRI was recommended and confirmed suspicion        Review of Systems   Constitutional, HEENT, cardiovascular, pulmonary, gi and gu systems are negative, except as otherwise noted.      Objective           Vitals:  No vitals were obtained today due to virtual visit.    Physical Exam   healthy, alert and no distress  PSYCH: Alert and oriented times 3; coherent speech, normal   rate and volume, able to articulate logical thoughts, able   to abstract reason, no tangential thoughts, no hallucinations   or delusions  His affect is normal  RESP: No cough, no audible wheezing, able to talk in full sentences  Remainder of exam unable to be completed due to telephone visits                Phone call duration: 7 minutes

## 2021-11-24 ENCOUNTER — ANCILLARY PROCEDURE (OUTPATIENT)
Dept: GENERAL RADIOLOGY | Facility: CLINIC | Age: 56
End: 2021-11-24
Attending: FAMILY MEDICINE
Payer: COMMERCIAL

## 2021-11-24 ENCOUNTER — NURSE TRIAGE (OUTPATIENT)
Dept: FAMILY MEDICINE | Facility: CLINIC | Age: 56
End: 2021-11-24

## 2021-11-24 ENCOUNTER — OFFICE VISIT (OUTPATIENT)
Dept: URGENT CARE | Facility: URGENT CARE | Age: 56
End: 2021-11-24
Payer: COMMERCIAL

## 2021-11-24 VITALS
TEMPERATURE: 98.1 F | SYSTOLIC BLOOD PRESSURE: 109 MMHG | WEIGHT: 193.4 LBS | HEART RATE: 83 BPM | OXYGEN SATURATION: 93 % | BODY MASS INDEX: 25.94 KG/M2 | DIASTOLIC BLOOD PRESSURE: 63 MMHG

## 2021-11-24 DIAGNOSIS — J20.9 ACUTE BRONCHITIS WITH COEXISTING CONDITION REQUIRING PROPHYLACTIC TREATMENT: ICD-10-CM

## 2021-11-24 DIAGNOSIS — U07.1 INFECTION DUE TO 2019 NOVEL CORONAVIRUS: ICD-10-CM

## 2021-11-24 DIAGNOSIS — J45.41 MODERATE PERSISTENT ASTHMA WITH EXACERBATION: ICD-10-CM

## 2021-11-24 DIAGNOSIS — U07.1 INFECTION DUE TO 2019 NOVEL CORONAVIRUS: Primary | ICD-10-CM

## 2021-11-24 PROCEDURE — 71046 X-RAY EXAM CHEST 2 VIEWS: CPT | Performed by: RADIOLOGY

## 2021-11-24 PROCEDURE — 99214 OFFICE O/P EST MOD 30 MIN: CPT | Performed by: FAMILY MEDICINE

## 2021-11-24 RX ORDER — AZITHROMYCIN 250 MG/1
TABLET, FILM COATED ORAL
Qty: 6 TABLET | Refills: 0 | Status: SHIPPED | OUTPATIENT
Start: 2021-11-24 | End: 2022-01-28

## 2021-11-24 RX ORDER — PREDNISONE 20 MG/1
TABLET ORAL
Qty: 20 TABLET | Refills: 0 | Status: SHIPPED | OUTPATIENT
Start: 2021-11-24 | End: 2022-04-01

## 2021-11-24 NOTE — PROGRESS NOTES
Chief complaint: covid shortness of breath    Oxygen holding at 93-94 even with exertion    9 days ago started feeling cough chills body aches  2 days later got a test and is positive  No fever   Still persistently sick  Now having increased discharge   Feeling shortness of breath   Wheezing: No  Chest pain or exertional shortness of breath: NO chest pain just shortness of breath   Exposure to pertussis or pertussis like symptoms: No  Orthopnea, worsening edema, pnd: NO  Rash: NO  Tried OTC medications without relief  No hemoptysis.  Worsening symptoms hence patient came in to be seen     Problem list and histories reviewed & adjusted, as indicated.  Additional history: as documented    Problem list, Medication list, Allergies, and Medical/Social/Surgical histories reviewed in [facility] and updated as appropriate.    ROS:  Constitutional, HEENT, cardiovascular, pulmonary, gi and gu systems are negative, except as otherwise noted.    OBJECTIVE:                                                    /63   Pulse 83   Temp 98.1  F (36.7  C) (Oral)   Wt 87.7 kg (193 lb 6.4 oz)   SpO2 93%   BMI 25.94 kg/m    Body mass index is 25.94 kg/m .  GENERAL: healthy, alert and no distress  EYES: pink palpebral conjunctiva, anicteric sclera  ENT: midline nasal septum normal ear exam. congested sinuses.   Mouth: moist buccal mucosa nonhyperemic posterior pharyngeal wall. No tonsillar enlargement or cellulitis  NECK: no adenopathy, no asymmetry, masses, or scars and thyroid normal to palpation  RESP: lungs clear to auscultation - No  rales, rhonchi or wheezes    CV: regular rate and rhythm, normal S1 S2, no S3 or S4,  No murmurs, click or rub  SKIN: no visible rashes noted  Pscyh: Appropriate mood and affect  MS: no gross musculoskeletal defects noted    Diagnostic Test Results:  No results found for this or any previous visit (from the past 24 hour(s)).     ASSESSMENT/PLAN:                                                         ICD-10-CM    1. Infection due to 2019 novel coronavirus  U07.1 XR Chest 2 Views   2. Acute bronchitis with coexisting condition requiring prophylactic treatment  J20.9 XR Chest 2 Views     azithromycin (ZITHROMAX) 250 MG tablet   3. Moderate persistent asthma with exacerbation  J45.41 predniSONE (DELTASONE) 20 MG tablet     Recommended ER patient declined  Chest xray showing bilateral pneumonia still consistent with covid to my review  Prescribed with zithromax  Prescribed with prednisone  Patient has albuterol  If with any symptoms of chest pain or shortness of breath, lightheadedness, palpitations, feeling like passing out or change and worsening in the quality of your symptoms, please proceed to ER. Recommend follow up with PCP in a few days for re-evaluation.   Patient encouraged to get a pulse oximeter- if worsening oxygen level patient advised to go to ER.   Adverse reactions of medications discussed.  Over the counter medications discussed.   Aware to come back in if with worsening symptoms or if no relief despite treatment plan  Patient voiced understanding and had no further questions.     MD Kelley Lancaster MD  [facility] [place]

## 2021-11-24 NOTE — TELEPHONE ENCOUNTER
Reason for Disposition    MILD difficulty breathing (e.g., minimal/no SOB at rest, SOB with walking, pulse <100)    Additional Information    Negative: SEVERE difficulty breathing (e.g., struggling for each breath, speaks in single words)    Negative: Difficult to awaken or acting confused (e.g., disoriented, slurred speech)    Negative: Bluish (or gray) lips or face now    Negative: Shock suspected (e.g., cold/pale/clammy skin, too weak to stand, low BP, rapid pulse)    Negative: Sounds like a life-threatening emergency to the triager    Negative: [1] COVID-19 exposure AND [2] no symptoms    Negative: COVID-19 vaccine reaction suspected (e.g., fever, headache, muscle aches) occurring 1 to 3 days after getting vaccine    Negative: COVID-19 vaccine, questions about    Negative: [1] Lives with someone known to have influenza (flu test positive) AND [2] flu-like symptoms (e.g., cough, runny nose, sore throat, SOB; with or without fever)    Negative: [1] Adult with possible COVID-19 symptoms AND [2] triager concerned about severity of symptoms or other causes    Negative: COVID-19 and breastfeeding, questions about    Negative: SEVERE or constant chest pain or pressure (Exception: mild central chest pain, present only when coughing)    Negative: MODERATE difficulty breathing (e.g., speaks in phrases, SOB even at rest, pulse 100-120)    Negative: [1] Headache AND [2] stiff neck (can't touch chin to chest)    Protocols used: CORONAVIRUS (COVID-19) DIAGNOSED OR [id number] 8.25.2021

## 2021-11-24 NOTE — TELEPHONE ENCOUNTER
Spoke with pt. States he tested positive for covid on 11/17. Has a productive cough with dark green phlegm. Short of breath sometimes, other than that is ok. Has some wheezing. Pt is able to speak ok, but did cough during the call. Has had fevers and chills. Has not checked temp. Pt concerned he may have an infection in his lungs and is requesting antibiotics. Advised of virtual appointment with our office or in person evaluation in urgent care. Pt agrees to go to [facility] .    Ginger Khoury RN  [facility]

## 2021-11-29 ENCOUNTER — VIRTUAL VISIT (OUTPATIENT)
Dept: GASTROENTEROLOGY | Facility: CLINIC | Age: 56
End: 2021-11-29
Attending: FAMILY MEDICINE
Payer: COMMERCIAL

## 2021-11-29 ENCOUNTER — OFFICE VISIT (OUTPATIENT)
Dept: URGENT CARE | Facility: URGENT CARE | Age: 56
End: 2021-11-29
Payer: COMMERCIAL

## 2021-11-29 VITALS
HEART RATE: 74 BPM | BODY MASS INDEX: 25.89 KG/M2 | WEIGHT: 193 LBS | SYSTOLIC BLOOD PRESSURE: 125 MMHG | TEMPERATURE: 98.6 F | DIASTOLIC BLOOD PRESSURE: 74 MMHG | OXYGEN SATURATION: 88 %

## 2021-11-29 DIAGNOSIS — R06.02 SOB (SHORTNESS OF BREATH): Primary | ICD-10-CM

## 2021-11-29 DIAGNOSIS — Z53.9 ERRONEOUS ENCOUNTER--DISREGARD: Primary | ICD-10-CM

## 2021-11-29 DIAGNOSIS — U07.1 COVID-19: ICD-10-CM

## 2021-11-29 PROCEDURE — 99207 PR NO BILLABLE SERVICE THIS VISIT: CPT | Performed by: NURSE PRACTITIONER

## 2021-11-30 NOTE — PROGRESS NOTES
Ceferino Crouch is a 56 year old male presenting with a chief complaint of sob.   His 02 is 88 I advised er (had covid 2 weeks ago and has been sob since and worsening)  He declines ambulance and will drive  Himself now    IVAN Ruiz CNP

## 2021-12-22 ENCOUNTER — TELEPHONE (OUTPATIENT)
Dept: SURGERY | Facility: CLINIC | Age: 56
End: 2021-12-22
Payer: COMMERCIAL

## 2021-12-22 NOTE — TELEPHONE ENCOUNTER
Patient had forms sent to Dr. Bunch for [facility] patients company has not received these forms Back. I have given patient the fax number for surgery schedule in [place] to have them fax these forms again. Thank you

## 2021-12-22 NOTE — TELEPHONE ENCOUNTER
Paperwork from the [place] has been received by fax at [facility] surgery scheduling I will route this to Dr. Bunch.

## 2021-12-24 ENCOUNTER — TELEPHONE (OUTPATIENT)
Dept: SURGERY | Facility: CLINIC | Age: 56
End: 2021-12-24
Payer: COMMERCIAL

## 2021-12-24 NOTE — TELEPHONE ENCOUNTER
Patient states his bowel movements are not normal after his surgery and wonders if there is something he can do.  Please call.    Thank you.

## 2021-12-27 NOTE — TELEPHONE ENCOUNTER
Patient states he has had a small bowel movement everyday but concerned it is not the same as before surgery'    He is passing a lot of gas and drinking fluids.    He states he has no pain or fever    Will route to Rn specialty pool for review    Lucille Hutchins MA

## 2021-12-27 NOTE — TELEPHONE ENCOUNTER
Patient calls stating he has had a change in his bowel movements. Patient said he has been passing a bowel movement everyday since his surgery 12/1/21 with Dr. Bunch, but that they are getting harder and harder to pass. He feels constipated like he still has to go more, but is having a hard time going. Patient states he gets stomach pain after he eats, but no other stomack pain. He is still passing gas. His fluid intake is 3-4 water bottles daily and 2 bottles of gatorade daily. Patient has a history of constipation back in 9/21 and was treated in the ER with miralax to relieve constipation. I have instructed patient to increase water intake over gatorade to 8-10 8 oz bottles or glasses daily. Patient was wondering if there is anything else he can do to help the constipation. Forwarding to provider to see if there is anything else we can do at this time.     Tika MIRELES RN, [facility] 12/27/21 11:26 AM

## 2022-01-06 ENCOUNTER — TELEPHONE (OUTPATIENT)
Dept: SURGERY | Facility: CLINIC | Age: 57
End: 2022-01-06

## 2022-01-25 ENCOUNTER — IMMUNIZATION (OUTPATIENT)
Dept: NURSING | Facility: CLINIC | Age: 57
End: 2022-01-25
Payer: COMMERCIAL

## 2022-01-25 DIAGNOSIS — Z23 HIGH PRIORITY FOR 2019-NCOV VACCINE: Primary | ICD-10-CM

## 2022-01-25 PROCEDURE — 99207 PR NO CHARGE LOS: CPT

## 2022-01-25 PROCEDURE — 0011A COVID-19,PF,MODERNA (18+ YRS PRIMARY SERIES .5ML): CPT

## 2022-01-25 PROCEDURE — 91301 COVID-19,PF,MODERNA (18+ YRS PRIMARY SERIES .5ML): CPT

## 2022-01-28 ENCOUNTER — OFFICE VISIT (OUTPATIENT)
Dept: FAMILY MEDICINE | Facility: CLINIC | Age: 57
End: 2022-01-28
Payer: COMMERCIAL

## 2022-01-28 VITALS
HEIGHT: 72 IN | DIASTOLIC BLOOD PRESSURE: 71 MMHG | BODY MASS INDEX: 24.24 KG/M2 | HEART RATE: 82 BPM | SYSTOLIC BLOOD PRESSURE: 108 MMHG | WEIGHT: 179 LBS | TEMPERATURE: 98.1 F | RESPIRATION RATE: 16 BRPM

## 2022-01-28 DIAGNOSIS — Z13.6 ENCOUNTER FOR LIPID SCREENING FOR CARDIOVASCULAR DISEASE: ICD-10-CM

## 2022-01-28 DIAGNOSIS — K63.1 SMALL BOWEL PERFORATION (H): Primary | ICD-10-CM

## 2022-01-28 DIAGNOSIS — R39.11 URINARY HESITANCY: ICD-10-CM

## 2022-01-28 DIAGNOSIS — Z13.220 ENCOUNTER FOR LIPID SCREENING FOR CARDIOVASCULAR DISEASE: ICD-10-CM

## 2022-01-28 PROBLEM — U07.1 COVID-19: Status: ACTIVE | Noted: 2021-11-30

## 2022-01-28 PROBLEM — R19.8 PERFORATED ABDOMINAL VISCUS: Status: ACTIVE | Noted: 2021-12-01

## 2022-01-28 LAB
ALBUMIN SERPL-MCNC: 3.4 G/DL (ref 3.4–5)
ALP SERPL-CCNC: 88 U/L (ref 40–150)
ALT SERPL W P-5'-P-CCNC: 27 U/L (ref 0–70)
ANION GAP SERPL CALCULATED.3IONS-SCNC: 5 MMOL/L (ref 3–14)
AST SERPL W P-5'-P-CCNC: 16 U/L (ref 0–45)
BILIRUB SERPL-MCNC: 0.2 MG/DL (ref 0.2–1.3)
BUN SERPL-MCNC: 13 MG/DL (ref 7–30)
CALCIUM SERPL-MCNC: 9.3 MG/DL (ref 8.5–10.1)
CHLORIDE BLD-SCNC: 107 MMOL/L (ref 94–109)
CHOLEST SERPL-MCNC: 166 MG/DL
CO2 SERPL-SCNC: 28 MMOL/L (ref 20–32)
CREAT SERPL-MCNC: 0.71 MG/DL (ref 0.66–1.25)
FASTING STATUS PATIENT QL REPORTED: ABNORMAL
GFR SERPL CREATININE-BSD FRML MDRD: >90 ML/MIN/1.73M2
GLUCOSE BLD-MCNC: 96 MG/DL (ref 70–99)
HDLC SERPL-MCNC: 50 MG/DL
LDLC SERPL CALC-MCNC: 101 MG/DL
NONHDLC SERPL-MCNC: 116 MG/DL
POTASSIUM BLD-SCNC: 4.2 MMOL/L (ref 3.4–5.3)
PROT SERPL-MCNC: 7.2 G/DL (ref 6.8–8.8)
SODIUM SERPL-SCNC: 140 MMOL/L (ref 133–144)
TRIGL SERPL-MCNC: 75 MG/DL

## 2022-01-28 PROCEDURE — 80053 COMPREHEN METABOLIC PANEL: CPT | Performed by: FAMILY MEDICINE

## 2022-01-28 PROCEDURE — 80061 LIPID PANEL: CPT | Performed by: FAMILY MEDICINE

## 2022-01-28 PROCEDURE — 99214 OFFICE O/P EST MOD 30 MIN: CPT | Performed by: FAMILY MEDICINE

## 2022-01-28 PROCEDURE — 36415 COLL VENOUS BLD VENIPUNCTURE: CPT | Performed by: FAMILY MEDICINE

## 2022-01-28 RX ORDER — TAMSULOSIN HYDROCHLORIDE 0.4 MG/1
0.8 CAPSULE ORAL EVERY EVENING
Qty: 90 CAPSULE | Refills: 1 | Status: SHIPPED | OUTPATIENT
Start: 2022-01-28 | End: 2022-04-01

## 2022-01-28 ASSESSMENT — MIFFLIN-ST. JEOR: SCORE: 1686.29

## 2022-01-28 ASSESSMENT — ASTHMA QUESTIONNAIRES: ACT_TOTALSCORE: 23

## 2022-01-28 NOTE — LETTER
February 1, 2022    Ceferinomorgan Durhamjuan a  [address] [address]          Dear ,    We are writing to inform you of your test results.    Your results are overall not concerning.       Resulted Orders   Comprehensive metabolic panel   Result Value Ref Range    Sodium 140 133 - 144 mmol/L    Potassium 4.2 3.4 - 5.3 mmol/L    Chloride 107 94 - 109 mmol/L    Carbon Dioxide (CO2) 28 20 - 32 mmol/L    Anion Gap 5 3 - 14 mmol/L    Urea Nitrogen 13 7 - 30 mg/dL    Creatinine 0.71 0.66 - 1.25 mg/dL    Calcium 9.3 8.5 - 10.1 mg/dL    Glucose 96 70 - 99 mg/dL    Alkaline Phosphatase 88 40 - 150 U/L    AST 16 0 - 45 U/L    ALT 27 0 - 70 U/L    Protein Total 7.2 6.8 - 8.8 g/dL    Albumin 3.4 3.4 - 5.0 g/dL    Bilirubin Total 0.2 0.2 - 1.3 mg/dL    GFR Estimate >90 >60 mL/min/1.73m2      Comment:      Effective December 21, 2021 eGFRcr in adults is calculated using the 2021 CKD-EPI creatinine equation which includes age and gender (Alvaro grissom al., NEJ, DOI: 10.1056/SFAYmq0119286)   Lipid panel reflex to direct LDL Non-fasting   Result Value Ref Range    Cholesterol 166 <200 mg/dL    Triglycerides 75 <150 mg/dL    Direct Measure HDL 50 >=40 mg/dL    LDL Cholesterol Calculated 101 (H) <=100 mg/dL    Non HDL Cholesterol 116 <130 mg/dL    Patient Fasting > 8hrs? Unknown     Narrative    Cholesterol  Desirable:  <200 mg/dL    Triglycerides  Normal:  Less than 150 mg/dL  Borderline High:  150-199 mg/dL  High:  200-499 mg/dL  Very High:  Greater than or equal to 500 mg/dL    Direct Measure HDL  Female:  Greater than or equal to 50 mg/dL   Male:  Greater than or equal to 40 mg/dL    LDL Cholesterol  Desirable:  <100mg/dL  Above Desirable:  100-129 mg/dL   Borderline High:  130-159 mg/dL   High:  160-189 mg/dL   Very High:  >= 190 mg/dL    Non HDL Cholesterol  Desirable:  130 mg/dL  Above Desirable:  130-159 mg/dL  Borderline High:  160-189 mg/dL  High:  190-219 mg/dL  Very High:  Greater than or equal to 220 mg/dL        If you have any questions or concerns, please call the clinic at the number listed above.       Sincerely,      Arturo Coffey MD

## 2022-01-28 NOTE — PROGRESS NOTES
"Assessment & Plan       ICD-10-CM    1. Small bowel perforation (H)  K63.1 CT Abdomen Pelvis w Contrast     Adult General Surg Referral     Comprehensive metabolic panel     Comprehensive metabolic panel   2. Encounter for lipid screening for cardiovascular disease  Z13.220 Lipid panel reflex to direct LDL Non-fasting    Z13.6 Lipid panel reflex to direct LDL Non-fasting   3. Urinary hesitancy  R39.11 tamsulosin (FLOMAX) 0.4 MG capsule         Review of external notes as documented elsewhere in note         There are no Patient Instructions on file for this visit.    Return in about 1 week (around 2/4/2022) for With Specialist.    Arturo Cagle MD  [facility]    Subjective     Ceferino Crouch is a 56 year old male who presents to clinic today for the following health issues accompanied by his none:    HPI       F/u from surgery that was on 12/3/21.   Intermittent abdominal pain post surgery.   Denies nausea.  Denies vomiting. Denies diarrhea.       COVID 11/17  Hypoxia  Bowel perforation - surgical repair  Persistent perf healed  Followed up with ID on 1/6  Ever since then, gassy, burping  Normal appetite  Regular BM, no diarrhea    covid vaccine Tuesday  Bump in axilla, no other symptoms  Wt Readings from Last 4 Encounters:   01/28/22 81.2 kg (179 lb)   11/29/21 87.5 kg (193 lb)   11/24/21 87.7 kg (193 lb 6.4 oz)   08/12/21 85.7 kg (189 lb)             Review of Systems   Constitutional, HEENT, cardiovascular, pulmonary, gi and gu systems are negative, except as otherwise noted.      Objective    /71   Pulse 82   Temp 98.1  F (36.7  C) (Oral)   Resp 16   Ht 1.839 m (6' 0.4\")   Wt 81.2 kg (179 lb)   BMI 24.01 kg/m    Body mass index is 24.01 kg/m .  Physical Exam   GENERAL: healthy, alert and no distress  EYES: Eyes grossly normal to inspection, PERRL and conjunctivae and sclerae normal  NECK: no adenopathy, no asymmetry, masses, or scars and thyroid normal to palpation  SKIN: " no suspicious lesions or rashes  PSYCH: mentation appears normal, affect normal/bright

## 2022-02-10 ENCOUNTER — OFFICE VISIT (OUTPATIENT)
Dept: SURGERY | Facility: CLINIC | Age: 57
End: 2022-02-10
Payer: COMMERCIAL

## 2022-02-10 VITALS — DIASTOLIC BLOOD PRESSURE: 70 MMHG | SYSTOLIC BLOOD PRESSURE: 118 MMHG

## 2022-02-10 DIAGNOSIS — R10.12 ABDOMINAL PAIN, LEFT UPPER QUADRANT: Primary | ICD-10-CM

## 2022-02-10 PROCEDURE — 99203 OFFICE O/P NEW LOW 30 MIN: CPT | Performed by: SURGERY
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
## 2022-02-18 ENCOUNTER — ANCILLARY PROCEDURE (OUTPATIENT)
Dept: CT IMAGING | Facility: CLINIC | Age: 57
End: 2022-02-18
Attending: FAMILY MEDICINE
Payer: COMMERCIAL

## 2022-02-18 DIAGNOSIS — K63.1 SMALL BOWEL PERFORATION (H): ICD-10-CM

## 2022-02-18 PROCEDURE — 74177 CT ABD & PELVIS W/CONTRAST: CPT | Mod: TC | Performed by: RADIOLOGY

## 2022-02-18 RX ORDER — IOPAMIDOL 755 MG/ML
100 INJECTION, SOLUTION INTRAVASCULAR ONCE
Status: COMPLETED | OUTPATIENT
Start: 2022-02-18 | End: 2022-02-18

## 2022-02-18 RX ADMIN — IOPAMIDOL 100 ML: 755 INJECTION, SOLUTION INTRAVASCULAR at 09:46

## 2022-02-23 ENCOUNTER — TELEPHONE (OUTPATIENT)
Dept: FAMILY MEDICINE | Facility: CLINIC | Age: 57
End: 2022-02-23
Payer: COMMERCIAL

## 2022-02-23 NOTE — TELEPHONE ENCOUNTER
The CT shows scarring, but acute infection couldn't be ruled out. I discussed results with patient by phone. His symptoms haven't changed, general strength is improving, and symptoms really don't fit with diverticulitis. Normal bowel movements. Will route to his general surgeon.   Mary Ellen Mejia MD

## 2022-02-23 NOTE — TELEPHONE ENCOUNTER
Patient called the clinic inquiring about the results and provider recommendations related to CT scan completed 2/18/22.    Please call patient [phone] as soon as possible.

## 2022-03-24 ENCOUNTER — TELEPHONE (OUTPATIENT)
Dept: FAMILY MEDICINE | Facility: CLINIC | Age: 57
End: 2022-03-24
Payer: COMMERCIAL

## 2022-03-24 NOTE — TELEPHONE ENCOUNTER
Reason for Call:  Form, our goal is to have forms completed with 72 hours, however, some forms may require a visit or additional information.    Type of letter, form or note:  [facility]    Who is the form from?: [facility] (if other please explain)    Where did the form come from: form was faxed in    What clinic location was the form placed at?: [facility]    Where the form was placed: Given to physician    What number is listed as a contact on the form?: [phone]  Hhh-[phone]       Suyapa Ye  [employer],       Call taken on 3/24/2022 at 10:50 AM by Suyapa Ye

## 2022-03-25 NOTE — TELEPHONE ENCOUNTER
Called patient N/A provider said patient needs to be seen to fill out papers or were they to go to surgeon.  Suyapa Young,

## 2022-04-01 ENCOUNTER — TELEPHONE (OUTPATIENT)
Dept: FAMILY MEDICINE | Facility: CLINIC | Age: 57
End: 2022-04-01

## 2022-04-01 ENCOUNTER — OFFICE VISIT (OUTPATIENT)
Dept: FAMILY MEDICINE | Facility: CLINIC | Age: 57
End: 2022-04-01
Payer: COMMERCIAL

## 2022-04-01 VITALS
SYSTOLIC BLOOD PRESSURE: 117 MMHG | HEART RATE: 78 BPM | BODY MASS INDEX: 24.87 KG/M2 | OXYGEN SATURATION: 98 % | WEIGHT: 185.4 LBS | DIASTOLIC BLOOD PRESSURE: 73 MMHG | TEMPERATURE: 98 F

## 2022-04-01 DIAGNOSIS — Z00.00 ROUTINE GENERAL MEDICAL EXAMINATION AT A HEALTH CARE FACILITY: Primary | ICD-10-CM

## 2022-04-01 DIAGNOSIS — R35.0 BENIGN PROSTATIC HYPERPLASIA WITH URINARY FREQUENCY: ICD-10-CM

## 2022-04-01 DIAGNOSIS — Z13.220 ENCOUNTER FOR LIPID SCREENING FOR CARDIOVASCULAR DISEASE: ICD-10-CM

## 2022-04-01 DIAGNOSIS — R39.11 URINARY HESITANCY: ICD-10-CM

## 2022-04-01 DIAGNOSIS — H61.22 IMPACTED CERUMEN OF LEFT EAR: ICD-10-CM

## 2022-04-01 DIAGNOSIS — J45.30 MILD PERSISTENT ASTHMA WITHOUT COMPLICATION: ICD-10-CM

## 2022-04-01 DIAGNOSIS — Z23 NEED FOR VACCINATION: ICD-10-CM

## 2022-04-01 DIAGNOSIS — Z12.5 SCREENING FOR PROSTATE CANCER: ICD-10-CM

## 2022-04-01 DIAGNOSIS — N40.1 BENIGN PROSTATIC HYPERPLASIA WITH URINARY FREQUENCY: ICD-10-CM

## 2022-04-01 DIAGNOSIS — Z13.6 ENCOUNTER FOR LIPID SCREENING FOR CARDIOVASCULAR DISEASE: ICD-10-CM

## 2022-04-01 DIAGNOSIS — M25.50 POLYARTHRALGIA: ICD-10-CM

## 2022-04-01 LAB
ALBUMIN SERPL-MCNC: 3.7 G/DL (ref 3.4–5)
ALBUMIN UR-MCNC: NEGATIVE MG/DL
ALP SERPL-CCNC: 65 U/L (ref 40–150)
ALT SERPL W P-5'-P-CCNC: 21 U/L (ref 0–70)
ANION GAP SERPL CALCULATED.3IONS-SCNC: 4 MMOL/L (ref 3–14)
APPEARANCE UR: CLEAR
AST SERPL W P-5'-P-CCNC: 19 U/L (ref 0–45)
BILIRUB SERPL-MCNC: 0.3 MG/DL (ref 0.2–1.3)
BILIRUB UR QL STRIP: NEGATIVE
BUN SERPL-MCNC: 16 MG/DL (ref 7–30)
CALCIUM SERPL-MCNC: 9.1 MG/DL (ref 8.5–10.1)
CHLORIDE BLD-SCNC: 109 MMOL/L (ref 94–109)
CHOLEST SERPL-MCNC: 142 MG/DL
CO2 SERPL-SCNC: 27 MMOL/L (ref 20–32)
COLOR UR AUTO: YELLOW
CREAT SERPL-MCNC: 0.84 MG/DL (ref 0.66–1.25)
FASTING STATUS PATIENT QL REPORTED: YES
GFR SERPL CREATININE-BSD FRML MDRD: >90 ML/MIN/1.73M2
GLUCOSE BLD-MCNC: 90 MG/DL (ref 70–99)
GLUCOSE UR STRIP-MCNC: NEGATIVE MG/DL
HDLC SERPL-MCNC: 60 MG/DL
HGB UR QL STRIP: NEGATIVE
KETONES UR STRIP-MCNC: NEGATIVE MG/DL
LDLC SERPL CALC-MCNC: 73 MG/DL
LEUKOCYTE ESTERASE UR QL STRIP: NEGATIVE
NITRATE UR QL: NEGATIVE
NONHDLC SERPL-MCNC: 82 MG/DL
PH UR STRIP: 5.5 [PH] (ref 5–7)
POTASSIUM BLD-SCNC: 4.3 MMOL/L (ref 3.4–5.3)
PROT SERPL-MCNC: 7.1 G/DL (ref 6.8–8.8)
PSA SERPL-MCNC: 0.7 UG/L (ref 0–4)
RBC #/AREA URNS AUTO: NORMAL /HPF
SODIUM SERPL-SCNC: 140 MMOL/L (ref 133–144)
SP GR UR STRIP: >=1.03 (ref 1–1.03)
TRIGL SERPL-MCNC: 47 MG/DL
UROBILINOGEN UR STRIP-ACNC: 0.2 E.U./DL
WBC #/AREA URNS AUTO: NORMAL /HPF

## 2022-04-01 PROCEDURE — 99396 PREV VISIT EST AGE 40-64: CPT | Mod: 25 | Performed by: FAMILY MEDICINE

## 2022-04-01 PROCEDURE — 90471 IMMUNIZATION ADMIN: CPT | Performed by: FAMILY MEDICINE

## 2022-04-01 PROCEDURE — 90472 IMMUNIZATION ADMIN EACH ADD: CPT | Performed by: FAMILY MEDICINE

## 2022-04-01 PROCEDURE — 81001 URINALYSIS AUTO W/SCOPE: CPT | Performed by: FAMILY MEDICINE

## 2022-04-01 PROCEDURE — 90750 HZV VACC RECOMBINANT IM: CPT | Performed by: FAMILY MEDICINE

## 2022-04-01 PROCEDURE — 36415 COLL VENOUS BLD VENIPUNCTURE: CPT | Performed by: FAMILY MEDICINE

## 2022-04-01 PROCEDURE — 90715 TDAP VACCINE 7 YRS/> IM: CPT | Performed by: FAMILY MEDICINE

## 2022-04-01 PROCEDURE — G0103 PSA SCREENING: HCPCS | Performed by: FAMILY MEDICINE

## 2022-04-01 PROCEDURE — 80053 COMPREHEN METABOLIC PANEL: CPT | Performed by: FAMILY MEDICINE

## 2022-04-01 PROCEDURE — 80061 LIPID PANEL: CPT | Performed by: FAMILY MEDICINE

## 2022-04-01 PROCEDURE — 99213 OFFICE O/P EST LOW 20 MIN: CPT | Mod: 25 | Performed by: FAMILY MEDICINE

## 2022-04-01 RX ORDER — TAMSULOSIN HYDROCHLORIDE 0.4 MG/1
0.8 CAPSULE ORAL EVERY EVENING
Qty: 90 CAPSULE | Refills: 1 | Status: SHIPPED | OUTPATIENT
Start: 2022-04-01 | End: 2022-04-02

## 2022-04-01 RX ORDER — TAMSULOSIN HYDROCHLORIDE 0.4 MG/1
CAPSULE ORAL
Refills: 1 | Status: CANCELLED | OUTPATIENT
Start: 2022-04-01

## 2022-04-01 RX ORDER — FLUTICASONE PROPIONATE 220 UG/1
2 AEROSOL, METERED RESPIRATORY (INHALATION) 2 TIMES DAILY
Qty: 12 G | Refills: 11 | Status: SHIPPED | OUTPATIENT
Start: 2022-04-01 | End: 2023-04-14

## 2022-04-01 ASSESSMENT — ENCOUNTER SYMPTOMS
HEMATURIA: 0
FEVER: 0
COUGH: 0
FREQUENCY: 1
ARTHRALGIAS: 1
HEMATOCHEZIA: 0
DIZZINESS: 0
NAUSEA: 0
SORE THROAT: 0
HEARTBURN: 0
JOINT SWELLING: 0
EYE PAIN: 0
DYSURIA: 0
PALPITATIONS: 0
SHORTNESS OF BREATH: 0
CONSTIPATION: 0
CHILLS: 0
WEAKNESS: 0
NERVOUS/ANXIOUS: 0
HEADACHES: 0
DIARRHEA: 0
MYALGIAS: 0
PARESTHESIAS: 0
ABDOMINAL PAIN: 0

## 2022-04-01 NOTE — TELEPHONE ENCOUNTER
Prescription has 2 sets of dosing instructions.   Please resend prescription with clarified instructions    Jolene Perez RN  [facility]

## 2022-04-01 NOTE — PROGRESS NOTES
SUBJECTIVE:   CC: Ceferino Crouch is an 56 year old male who presents for preventative health visit.   Patient has been advised of split billing requirements and indicates understanding: Yes  Healthy Habits:     Getting at least 3 servings of Calcium per day:  Yes    Bi-annual eye exam:  NO    Dental care twice a year:  Yes    Sleep apnea or symptoms of sleep apnea:  None    Diet:  Regular (no restrictions)    Frequency of exercise:  2-3 days/week    Duration of exercise:  15-30 minutes    Taking medications regularly:  Yes    Medication side effects:  None    PHQ-2 Total Score: 0    Additional concerns today:  Yes (Urinary frequency )      Nocturnal frequency  Daytime frequency  Easy to start urinating, feels urine stream cuts off early    History of asthma  Needs flovent refills  Overall well controlled    Ear wash  Wt Readings from Last 4 Encounters:   04/01/22 84.1 kg (185 lb 6.4 oz)   01/28/22 81.2 kg (179 lb)   11/29/21 87.5 kg (193 lb)   11/24/21 87.7 kg (193 lb 6.4 oz)           Today's PHQ-2 Score:   PHQ-2 ( 1999 Pfizer) 4/1/2022   Q1: Little interest or pleasure in doing things 0   Q2: Feeling down, depressed or hopeless 0   PHQ-2 Score 0   PHQ-2 Total Score (12-17 Years)- Positive if 3 or more points; Administer PHQ-A if positive -   Q1: Little interest or pleasure in doing things Not at all   Q2: Feeling down, depressed or hopeless Not at all   PHQ-2 Score 0       Abuse: Current or Past(Physical, Sexual or Emotional)- No  Do you feel safe in your environment? Yes        Social History     Tobacco Use     Smoking status: Never Smoker     Smokeless tobacco: Never Used     Tobacco comment: Lives in smoke free household   Substance Use Topics     Alcohol use: Yes     Comment: Social drinks occasionally.     If you drink alcohol do you typically have >3 drinks per day or >7 drinks per week? No    Alcohol Use 4/1/2022   Prescreen: >3 drinks/day or >7 drinks/week? No   Prescreen: >3 drinks/day or >7  drinks/week? -   No flowsheet data found.    Last PSA:   PSA   Date Value Ref Range Status   12/28/2020 0.85 0 - 4 ug/L Final     Comment:     Assay Method:  Chemiluminescence using Siemens Vista analyzer       Reviewed orders with patient. Reviewed health maintenance and updated orders accordingly - Yes  BP Readings from Last 3 Encounters:   04/01/22 117/73   02/10/22 118/70   01/28/22 108/71    Wt Readings from Last 3 Encounters:   04/01/22 84.1 kg (185 lb 6.4 oz)   01/28/22 81.2 kg (179 lb)   11/29/21 87.5 kg (193 lb)                    Reviewed and updated as needed this visit by clinical staff   Tobacco  Allergies  Meds              Reviewed and updated as needed this visit by Provider                     Review of Systems   Constitutional: Negative for chills and fever.   HENT: Negative for congestion, ear pain, hearing loss and sore throat.    Eyes: Negative for pain and visual disturbance.   Respiratory: Negative for cough and shortness of breath.    Cardiovascular: Negative for chest pain, palpitations and peripheral edema.   Gastrointestinal: Negative for abdominal pain, constipation, diarrhea, heartburn, hematochezia and nausea.   Genitourinary: Positive for frequency and urgency. Negative for dysuria, genital sores, hematuria, impotence and penile discharge.   Musculoskeletal: Positive for arthralgias. Negative for joint swelling and myalgias.   Skin: Negative for rash.   Neurological: Negative for dizziness, weakness, headaches and paresthesias.   Psychiatric/Behavioral: Negative for mood changes. The patient is not nervous/anxious.          OBJECTIVE:   /73   Pulse 78   Temp 98  F (36.7  C) (Oral)   Wt 84.1 kg (185 lb 6.4 oz)   SpO2 98%   BMI 24.87 kg/m      Physical Exam  GENERAL: healthy, alert and no distress  EYES: Eyes grossly normal to inspection, PERRL and conjunctivae and sclerae normal  HENT: ear canals and TM's normal, nose and mouth without ulcers or lesions  NECK: no  adenopathy, no asymmetry, masses, or scars and thyroid normal to palpation  RESP: lungs clear to auscultation - no rales, rhonchi or wheezes  CV: regular rate and rhythm, normal S1 S2, no S3 or S4, no murmur, click or rub, no peripheral edema and peripheral pulses strong  ABDOMEN: soft, nontender, no hepatosplenomegaly, no masses and bowel sounds normal  MS: no gross musculoskeletal defects noted, no edema  SKIN: no suspicious lesions or rashes  NEURO: Normal strength and tone, mentation intact and speech normal  PSYCH: mentation appears normal, affect normal/bright        ASSESSMENT/PLAN:       ICD-10-CM    1. Routine general medical examination at a health care facility  Z00.00 REVIEW OF HEALTH MAINTENANCE PROTOCOL ORDERS     Prostate Specific Antigen Screen     Comprehensive metabolic panel     Lipid panel reflex to direct LDL Fasting     UA with Microscopic reflex to Culture - lab collect     UA with Microscopic reflex to Culture - lab collect     Lipid panel reflex to direct LDL Fasting     Comprehensive metabolic panel     Prostate Specific Antigen Screen     Urine Microscopic   2. Screening for prostate cancer  Z12.5 Prostate Specific Antigen Screen     Prostate Specific Antigen Screen   3. Encounter for lipid screening for cardiovascular disease  Z13.220 Lipid panel reflex to direct LDL Fasting    Z13.6 Lipid panel reflex to direct LDL Fasting   4. Mild persistent asthma without complication  J45.30 fluticasone (FLOVENT HFA) 220 MCG/ACT inhaler   5. Polyarthralgia  M25.50 diclofenac (VOLTAREN) 1 % topical gel   6. Impacted cerumen of left ear  H61.22 REMOVE IMPACTED CERUMEN     REMOVE IMPACTED CERUMEN   7. Benign prostatic hyperplasia with urinary frequency  N40.1 Adult Urology Referral    R35.0    8. Urinary hesitancy  R39.11 DISCONTINUED: tamsulosin (FLOMAX) 0.4 MG capsule   9. Need for vaccination  Z23 SHINGRIX [[id number]]     TDAP VACCINE (Adacel, Boostrix)  [[id number]]    Cerumen removed with ear flush by  "MA without complication.     Patient has been advised of split billing requirements and indicates understanding: Yes    COUNSELING:   Reviewed preventive health counseling, as reflected in patient instructions       Regular exercise       Healthy diet/nutrition    Estimated body mass index is 24.87 kg/m  as calculated from the following:    Height as of 1/28/22: 1.839 m (6' 0.4\").    Weight as of this encounter: 84.1 kg (185 lb 6.4 oz).     Weight management plan: Discussed healthy diet and exercise guidelines    He reports that he has never smoked. He has never used smokeless tobacco.      Counseling Resources:  ATP IV Guidelines  Pooled Cohorts Equation Calculator  FRAX Risk Assessment  ICSI Preventive Guidelines  Dietary Guidelines for Americans, 2010  USDA's MyPlate  ASA Prophylaxis  Lung CA Screening    Arturo Cagle MD  [facility]  "

## 2022-04-01 NOTE — NURSING NOTE
Prior to immunization administration, verified patients identity using patient s name and date of birth. Please see Immunization Activity for additional information.     Screening Questionnaire for Adult Immunization    Are you sick today?   No   Do you have allergies to medications, food, a vaccine component or latex?   No   Have you ever had a serious reaction after receiving a vaccination?   No   Do you have a long-term health problem with heart, lung, kidney, or metabolic disease (e.g., diabetes), asthma, a blood disorder, no spleen, complement component deficiency, a cochlear implant, or a spinal fluid leak?  Are you on long-term aspirin therapy?   No   Do you have cancer, leukemia, HIV/AIDS, or any other immune system problem?   No   Do you have a parent, brother, or sister with an immune system problem?   No   In the past 3 months, have you taken medications that affect  your immune system, such as prednisone, other steroids, or anticancer drugs; drugs for the treatment of rheumatoid arthritis, Crohn s disease, or psoriasis; or have you had radiation treatments?   No   Have you had a seizure, or a brain or other nervous system problem?   No   During the past year, have you received a transfusion of blood or blood    products, or been given immune (gamma) globulin or antiviral drug?   No   For women: Are you pregnant or is there a chance you could become       pregnant during the next month?   No   Have you received any vaccinations in the past 4 weeks?   No     Immunization questionnaire answers were all negative.        Per orders of Dr. Coffey, injection of Shingrix, TDAP given by Xochitl Pak CMA. Patient instructed to remain in clinic for 15 minutes afterwards, and to report any adverse reaction to me immediately.       Screening performed by Xochitl Pak CMA on 4/1/2022 at 10:00 AM.

## 2022-04-01 NOTE — PATIENT INSTRUCTIONS
Preventive Health Recommendations  Male Ages 50 - 64    Yearly exam:             See your health care provider every year in order to  o   Review health changes.   o   Discuss preventive care.    o   Review your medicines if your doctor has prescribed any.     Have a cholesterol test every 5 years, or more frequently if you are at risk for high cholesterol/heart disease.     Have a diabetes test (fasting glucose) every three years. If you are at risk for diabetes, you should have this test more often.     Have a colonoscopy at age 50, or have a yearly FIT test (stool test). These exams will check for colon cancer.      Talk with your health care provider about whether or not a prostate cancer screening test (PSA) is right for you.    You should be tested each year for STDs (sexually transmitted diseases), if you re at risk.     Shots: Get a flu shot each year. Get a tetanus shot every 10 years.     Nutrition:    Eat at least 5 servings of fruits and vegetables daily.     Eat whole-grain bread, whole-wheat pasta and brown rice instead of white grains and rice.     Get adequate Calcium and Vitamin D.     Lifestyle    Exercise for at least 150 minutes a week (30 minutes a day, 5 days a week). This will help you control your weight and prevent disease.     Limit alcohol to one drink per day.     No smoking.     Wear sunscreen to prevent skin cancer.     See your dentist every six months for an exam and cleaning.     See your eye doctor every 1 to 2 years.      Healthy Lifestyle   Nutrition and healthy eating: The Mediterranean Diet  Ready to switch to a more heart-healthy diet? Here's how to get started with the Mediterranean diet.  By [facility] Staff   If you're looking for a heart-healthy eating plan, the Mediterranean diet might be right for you.  The Mediterranean diet blends the basics of healthy eating with the traditional flavors and cooking methods of the Mediterranean.  Interest in the Mediterranean diet  began in the 1960s with the observation that coronary heart disease caused fewer deaths in Mediterranean countries, such as [place] and [place], than in the [place] and [place]. Subsequent studies found that the Mediterranean diet is associated with reduced risk factors for cardiovascular disease.  The Mediterranean diet is one of the healthy eating plans recommended by the Dietary Guidelines for Americans to promote health and prevent chronic disease.  It is also recognized by the World Health Organization as a healthy and sustainable dietary pattern and as an intangible cultural asset by the United National Educational, Scientific and Cultural Organization.  The Mediterranean diet is a way of eating based on the traditional cuisine of countries bordering the [place] Sea. While there is no single definition of the Mediterranean diet, it is typically high in vegetables, fruits, whole grains, beans, nut and seeds, and olive oil.  The main components of Mediterranean diet include:    Daily consumption of vegetables, fruits, whole grains and healthy fats     Weekly intake of fish, poultry, beans and eggs     Moderate portions of dairy products     Limited intake of red meat  Other important elements of the Mediterranean diet are sharing meals with family and friends, enjoying a glass of red wine and being physically active.  The foundation of the Mediterranean diet is vegetables, fruits, herbs, nuts, beans and whole grains. Meals are built around these plant-based foods. Moderate amounts of dairy, poultry and eggs are also central to the Mediterranean Diet, as is seafood. In contrast, red meat is eaten only occasionally.  Healthy fats are a mainstay of the Mediterranean diet. They're eaten instead of less healthy fats, such as saturated and trans fats, which contribute to heart disease.  Olive oil is the primary source of added fat in the Mediterranean diet. Olive oil provides monounsaturated fat, which has  "been found to lower total cholesterol and low-density lipoprotein (LDL or \"bad\") cholesterol levels. Nuts and seeds also contain monounsaturated fat.  Fish are also important in the Mediterranean diet. Fatty fish -- such as mackerel, herring, sardines, albacore tuna, salmon and lake trout -- are rich in omega-3 fatty acids, a type of polyunsaturated fat that may reduce inflammation in the body. Omega-3 fatty acids also help decrease triglycerides, reduce blood clotting, and decrease the risk of stroke and heart failure.  The Mediterranean diet typically allows red wine in moderation. Although alcohol has been associated with a reduced risk of heart disease in some studies, it's by no means risk free. The Dietary Guidelines for Americans caution against beginning to drink or drinking more often on the basis of potential health benefits.  Interested in trying the Mediterranean diet? These tips will help you get started:    Eat more fruits and vegetables. Aim for 7 to 10 servings a day of fruit and vegetables.     Opt for whole grains. Switch to whole-grain bread, cereal and pasta. [place] with other whole grains, such as bulgur and farro.     Use healthy fats. Try olive oil as a replacement for butter when cooking. Instead of putting butter or margarine on bread, try dipping it in flavored olive oil.     Eat more seafood. Eat fish twice a week. Fresh or water-packed tuna, salmon, trout, mackerel and herring are healthy choices. Grilled fish tastes good and requires little cleanup. Avoid deep-fried fish.     Reduce red meat. Substitute fish, poultry or beans for meat. If you eat meat, make sure it's lean and keep portions small.     Enjoy some dairy. Eat low-fat Greek or plain yogurt and small amounts of a variety of cheeses.     Spice it up. Herbs and spices boost flavor and lessen the need for salt.  The Mediterranean diet is a delicious and healthy way to eat. Many people who switch to this style of eating say " they'll never eat any other way.

## 2022-04-02 RX ORDER — TAMSULOSIN HYDROCHLORIDE 0.4 MG/1
0.8 CAPSULE ORAL EVERY EVENING
Qty: 180 CAPSULE | Refills: 1 | Status: SHIPPED | OUTPATIENT
Start: 2022-04-02 | End: 2022-07-01

## 2022-04-04 DIAGNOSIS — Z86.73 HISTORY OF TIA (TRANSIENT ISCHEMIC ATTACK) AND STROKE: ICD-10-CM

## 2022-04-04 DIAGNOSIS — E78.5 HYPERLIPIDEMIA LDL GOAL <70: Primary | ICD-10-CM

## 2022-04-04 PROBLEM — R19.8 PERFORATED ABDOMINAL VISCUS: Status: RESOLVED | Noted: 2021-12-01 | Resolved: 2022-04-04

## 2022-04-04 PROBLEM — U07.1 COVID-19: Status: RESOLVED | Noted: 2021-11-30 | Resolved: 2022-04-04

## 2022-04-04 RX ORDER — ATORVASTATIN CALCIUM 40 MG/1
40 TABLET, FILM COATED ORAL DAILY
Qty: 90 TABLET | Refills: 3 | Status: SHIPPED | OUTPATIENT
Start: 2022-04-04 | End: 2022-12-23

## 2022-04-04 NOTE — RESULT ENCOUNTER NOTE
Please call patient:  Your prostate, urine, liver, kidney, and blood glucose tests are normal. Your cholesterol is improving, but you are still a candidate for a cholesterol lowering medication due to history of mini stroke.     You can lower your risk for heart disease by taking a daily cholesterol medication called atorvastatin 40 mg daily. I've sent a prescription when you're ready. Possible side effects include muscle aches and liver problems. Return for lab only recheck in 6 to 8 weeks while fasting (nothing but water and medications for at least 8 hours.)  You may call [phone] or go to [email or web address] [place].    Follow-up with your provider yearly.     Mary Ellen Mejia MD

## 2022-05-18 ENCOUNTER — LAB (OUTPATIENT)
Dept: LAB | Facility: CLINIC | Age: 57
End: 2022-05-18
Payer: COMMERCIAL

## 2022-05-18 DIAGNOSIS — Z86.73 HISTORY OF TIA (TRANSIENT ISCHEMIC ATTACK) AND STROKE: ICD-10-CM

## 2022-05-18 DIAGNOSIS — E78.5 HYPERLIPIDEMIA LDL GOAL <70: ICD-10-CM

## 2022-05-18 LAB
CHOLEST SERPL-MCNC: 103 MG/DL
FASTING STATUS PATIENT QL REPORTED: YES
HDLC SERPL-MCNC: 60 MG/DL
LDLC SERPL CALC-MCNC: 34 MG/DL
NONHDLC SERPL-MCNC: 43 MG/DL
TRIGL SERPL-MCNC: 45 MG/DL

## 2022-05-18 PROCEDURE — 36415 COLL VENOUS BLD VENIPUNCTURE: CPT

## 2022-05-18 PROCEDURE — 80061 LIPID PANEL: CPT

## 2022-05-24 ENCOUNTER — OFFICE VISIT (OUTPATIENT)
Dept: UROLOGY | Facility: CLINIC | Age: 57
End: 2022-05-24
Attending: FAMILY MEDICINE
Payer: COMMERCIAL

## 2022-05-24 VITALS
SYSTOLIC BLOOD PRESSURE: 113 MMHG | TEMPERATURE: 97.8 F | HEART RATE: 73 BPM | DIASTOLIC BLOOD PRESSURE: 73 MMHG | OXYGEN SATURATION: 100 %

## 2022-05-24 DIAGNOSIS — N40.1 BENIGN PROSTATIC HYPERPLASIA WITH URINARY FREQUENCY: ICD-10-CM

## 2022-05-24 DIAGNOSIS — R35.0 BENIGN PROSTATIC HYPERPLASIA WITH URINARY FREQUENCY: ICD-10-CM

## 2022-05-24 DIAGNOSIS — R63.8 EXCESSIVE FLUID INTAKE: Primary | ICD-10-CM

## 2022-05-24 PROCEDURE — 51798 US URINE CAPACITY MEASURE: CPT | Performed by: UROLOGY

## 2022-05-24 PROCEDURE — 99204 OFFICE O/P NEW MOD 45 MIN: CPT | Mod: 25 | Performed by: UROLOGY
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
## 2022-05-24 NOTE — PROGRESS NOTES
4-5 WATER 16 OUNCES EACH  1 milk  1 juice  Bladder scan performed 0ml detected in bladder. Ginger Quintana, CMA

## 2022-06-07 NOTE — PROGRESS NOTES
Assessment & Plan     Cough  - Streptococcus A Rapid Screen w/Reflex to PCR - Clinic Collect  - Asymptomatic COVID-19 Virus (Coronavirus) by PCR Nasopharyngeal  - Group A Streptococcus PCR Throat Swab    Pneumonia of left lower lobe due to infectious organism  Counseled on self-care measures including: OTC acetaminophen or guaifenesin PRN, hydration, rest, handwashing, and red flags of when to seek urgent medical attention including difficulty breathing.  - azithromycin (ZITHROMAX) 250 MG tablet; Take 500 mg (2 tabs) by mouth on day 1. Then take 250 mg (1 tab) by mouth on days 2-5.     See Patient Instructions    Return in about 1 week (around 6/15/2022) for follow up if symptoms worsen or do not improve.    IVAN Kinney CNP  [facility] JAYDEN De La Vega is a 56 year old who presents for the following health issues  accompanied by his none.    History of Present Illness       Reason for visit:  Sore thoart  Symptoms include:  Headache ear pain sore thorat  Symptom intensity:  Moderate  Symptom progression:  Staying the same  Had these symptoms before:  No  What makes it worse:  No  What makes it better:  No    He eats 2-3 servings of fruits and vegetables daily.He consumes 1 sweetened beverage(s) daily.He exercises with enough effort to increase his heart rate 10 to 19 minutes per day.  He exercises with enough effort to increase his heart rate 7 days per week.   He is taking medications regularly.     Acute Illness  Acute illness concerns: Patient went to a [facility] clinic on 6/1. Tested negative for strep and covid.   Onset/Duration: 2 weeks   Symptoms:  Fever: no  Chills/Sweats: YES- sweats at night. No chills.   Headache (location?): YES  Sinus Pressure: no  Conjunctivitis:  no  Ear Pain: YES: right. Ears feel plugged.   Rhinorrhea: YES  Congestion: no  Sore Throat: YES  Cough: YES-productive of yellow sputum  Wheeze: no  Decreased Appetite: no  Nausea: no  Vomiting:  "no  Diarrhea: no  Dysuria/Freq.: no  Dysuria or Hematuria: no  Fatigue/Achiness: Yes- fatigue. No body aches.   Sick/Strep Exposure: coworker with strep throat a couple weeks ago  Therapies tried and outcome: Advil for headaches.     Review of Systems   Constitutional, HEENT, cardiovascular, pulmonary, GI, , musculoskeletal, neuro, skin, endocrine and psych systems are negative, except as otherwise noted.      Objective    /70   Pulse 74   Temp 97.4  F (36.3  C) (Oral)   Resp 16   Ht 1.839 m (6' 0.4\")   Wt 84.8 kg (187 lb)   SpO2 98%   BMI 25.08 kg/m    Body mass index is 25.08 kg/m .  Physical Exam   GENERAL: healthy, alert and no distress  EYES: Eyes grossly normal to inspection, PERRL and conjunctivae and sclerae normal  HENT: ear canals and TM's normal, nose and mouth without ulcers or lesions  NECK: no adenopathy, no asymmetry, masses, or scars and thyroid normal to palpation  RESP: rales L lower posterior  CV: regular rate and rhythm, normal S1 S2, no S3 or S4, no murmur, click or rub, no peripheral edema and peripheral pulses strong  PSYCH: mentation appears normal, affect normal/bright    Results for orders placed or performed in visit on 06/08/22 (from the past 24 hour(s))   Streptococcus A Rapid Screen w/Reflex to PCR - Clinic Collect    Specimen: Throat; Swab   Result Value Ref Range    Group A Strep antigen Negative Negative             "

## 2022-06-08 ENCOUNTER — OFFICE VISIT (OUTPATIENT)
Dept: FAMILY MEDICINE | Facility: CLINIC | Age: 57
End: 2022-06-08
Payer: COMMERCIAL

## 2022-06-08 VITALS
HEIGHT: 72 IN | TEMPERATURE: 97.4 F | HEART RATE: 74 BPM | BODY MASS INDEX: 25.33 KG/M2 | SYSTOLIC BLOOD PRESSURE: 128 MMHG | RESPIRATION RATE: 16 BRPM | DIASTOLIC BLOOD PRESSURE: 70 MMHG | WEIGHT: 187 LBS | OXYGEN SATURATION: 98 %

## 2022-06-08 DIAGNOSIS — R05.9 COUGH: Primary | ICD-10-CM

## 2022-06-08 DIAGNOSIS — J18.9 PNEUMONIA OF LEFT LOWER LOBE DUE TO INFECTIOUS ORGANISM: ICD-10-CM

## 2022-06-08 LAB
DEPRECATED S PYO AG THROAT QL EIA: NEGATIVE
GROUP A STREP BY PCR: NOT DETECTED
SARS-COV-2 RNA RESP QL NAA+PROBE: NEGATIVE

## 2022-06-08 PROCEDURE — 87651 STREP A DNA AMP PROBE: CPT | Performed by: NURSE PRACTITIONER

## 2022-06-08 PROCEDURE — U0003 INFECTIOUS AGENT DETECTION BY NUCLEIC ACID (DNA OR RNA); SEVERE ACUTE RESPIRATORY SYNDROME CORONAVIRUS 2 (SARS-COV-2) (CORONAVIRUS DISEASE [COVID-19]), AMPLIFIED PROBE TECHNIQUE, MAKING USE OF HIGH THROUGHPUT TECHNOLOGIES AS DESCRIBED BY CMS-2020-01-R: HCPCS | Performed by: NURSE PRACTITIONER

## 2022-06-08 PROCEDURE — 99213 OFFICE O/P EST LOW 20 MIN: CPT | Performed by: NURSE PRACTITIONER

## 2022-06-08 PROCEDURE — U0005 INFEC AGEN DETEC AMPLI PROBE: HCPCS | Performed by: NURSE PRACTITIONER

## 2022-06-08 RX ORDER — AZITHROMYCIN 250 MG/1
TABLET, FILM COATED ORAL
Qty: 6 TABLET | Refills: 0 | Status: SHIPPED | OUTPATIENT
Start: 2022-06-08 | End: 2022-12-23

## 2022-06-08 NOTE — LETTER
June 9, 2022    Ceferino GALILEO Willa  [address] [address]          Dear ,    We are writing to inform you of your test results.    Here are your recent results which are within the expected range. Please continue with your current plan of care and let us know if you have any questions or concerns.       Resulted Orders   Streptococcus A Rapid Screen w/Reflex to PCR - Clinic Collect   Result Value Ref Range    Group A Strep antigen Negative Negative   Group A Streptococcus PCR Throat Swab   Result Value Ref Range    Group A strep by PCR Not Detected Not Detected    Narrative    The Xpert Xpress Strep A test, performed on the [employer]SE[employer]  Instrument Systems, is a rapid, qualitative in vitro diagnostic test for the detection of Streptococcus pyogenes (Group A ß-hemolytic Streptococcus, Strep A) in throat swab specimens from patients with signs and symptoms of pharyngitis. The Xpert Xpress Strep A test can be used as an aid in the diagnosis of Group A Streptococcal pharyngitis. The assay is not intended to monitor treatment for Group A Streptococcus infections. The Xpert Xpress Strep A test utilizes an automated real-time polymerase chain reaction (PCR) to detect Streptococcus pyogenes DNA.       If you have any questions or concerns, please call the clinic at the number listed above.       Sincerely,      IVAN Stinson CNP

## 2022-11-11 ENCOUNTER — OFFICE VISIT (OUTPATIENT)
Dept: FAMILY MEDICINE | Facility: CLINIC | Age: 57
End: 2022-11-11
Payer: COMMERCIAL

## 2022-11-11 VITALS
TEMPERATURE: 97.2 F | SYSTOLIC BLOOD PRESSURE: 124 MMHG | HEART RATE: 63 BPM | DIASTOLIC BLOOD PRESSURE: 75 MMHG | WEIGHT: 185 LBS | BODY MASS INDEX: 25.06 KG/M2 | OXYGEN SATURATION: 98 % | HEIGHT: 72 IN

## 2022-11-11 DIAGNOSIS — R19.7 DIARRHEA, UNSPECIFIED TYPE: Primary | ICD-10-CM

## 2022-11-11 LAB
ANION GAP SERPL CALCULATED.3IONS-SCNC: 1 MMOL/L (ref 3–14)
BUN SERPL-MCNC: 21 MG/DL (ref 7–30)
CALCIUM SERPL-MCNC: 9.5 MG/DL (ref 8.5–10.1)
CHLORIDE BLD-SCNC: 110 MMOL/L (ref 94–109)
CO2 SERPL-SCNC: 30 MMOL/L (ref 20–32)
CREAT SERPL-MCNC: 1.03 MG/DL (ref 0.66–1.25)
CRP SERPL-MCNC: <2.9 MG/L (ref 0–8)
ERYTHROCYTE [DISTWIDTH] IN BLOOD BY AUTOMATED COUNT: 12.5 % (ref 10–15)
ERYTHROCYTE [SEDIMENTATION RATE] IN BLOOD BY WESTERGREN METHOD: 4 MM/HR (ref 0–20)
GFR SERPL CREATININE-BSD FRML MDRD: 85 ML/MIN/1.73M2
GLUCOSE BLD-MCNC: 96 MG/DL (ref 70–99)
HCT VFR BLD AUTO: 44.3 % (ref 40–53)
HGB BLD-MCNC: 14.4 G/DL (ref 13.3–17.7)
MCH RBC QN AUTO: 28.3 PG (ref 26.5–33)
MCHC RBC AUTO-ENTMCNC: 32.5 G/DL (ref 31.5–36.5)
MCV RBC AUTO: 87 FL (ref 78–100)
PLATELET # BLD AUTO: 276 10E3/UL (ref 150–450)
POTASSIUM BLD-SCNC: 4.6 MMOL/L (ref 3.4–5.3)
RBC # BLD AUTO: 5.08 10E6/UL (ref 4.4–5.9)
SODIUM SERPL-SCNC: 141 MMOL/L (ref 133–144)
WBC # BLD AUTO: 7.2 10E3/UL (ref 4–11)

## 2022-11-11 PROCEDURE — 86140 C-REACTIVE PROTEIN: CPT | Performed by: PHYSICIAN ASSISTANT

## 2022-11-11 PROCEDURE — 85027 COMPLETE CBC AUTOMATED: CPT | Performed by: PHYSICIAN ASSISTANT

## 2022-11-11 PROCEDURE — 80048 BASIC METABOLIC PNL TOTAL CA: CPT | Performed by: PHYSICIAN ASSISTANT

## 2022-11-11 PROCEDURE — 99214 OFFICE O/P EST MOD 30 MIN: CPT | Performed by: PHYSICIAN ASSISTANT

## 2022-11-11 PROCEDURE — 85652 RBC SED RATE AUTOMATED: CPT | Performed by: PHYSICIAN ASSISTANT

## 2022-11-11 PROCEDURE — 36415 COLL VENOUS BLD VENIPUNCTURE: CPT | Performed by: PHYSICIAN ASSISTANT

## 2022-11-11 RX ORDER — TAMSULOSIN HYDROCHLORIDE 0.4 MG/1
CAPSULE ORAL
COMMUNITY
Start: 2022-09-09 | End: 2023-03-03

## 2022-11-11 ASSESSMENT — ENCOUNTER SYMPTOMS
NAUSEA: 0
DIZZINESS: 0
SHORTNESS OF BREATH: 0
CHILLS: 0
WEAKNESS: 0
FEVER: 0
LIGHT-HEADEDNESS: 0
ABDOMINAL PAIN: 0
NERVOUS/ANXIOUS: 0
VOMITING: 0
DIARRHEA: 1

## 2022-11-11 ASSESSMENT — ASTHMA QUESTIONNAIRES
ACT_TOTALSCORE: 24
QUESTION_1 LAST FOUR WEEKS HOW MUCH OF THE TIME DID YOUR ASTHMA KEEP YOU FROM GETTING AS MUCH DONE AT WORK, SCHOOL OR AT HOME: NONE OF THE TIME
QUESTION_3 LAST FOUR WEEKS HOW OFTEN DID YOUR ASTHMA SYMPTOMS (WHEEZING, COUGHING, SHORTNESS OF BREATH, CHEST TIGHTNESS OR PAIN) WAKE YOU UP AT NIGHT OR EARLIER THAN USUAL IN THE MORNING: NOT AT ALL
QUESTION_2 LAST FOUR WEEKS HOW OFTEN HAVE YOU HAD SHORTNESS OF BREATH: NOT AT ALL
QUESTION_4 LAST FOUR WEEKS HOW OFTEN HAVE YOU USED YOUR RESCUE INHALER OR NEBULIZER MEDICATION (SUCH AS ALBUTEROL): NOT AT ALL
ACT_TOTALSCORE: 24
QUESTION_5 LAST FOUR WEEKS HOW WOULD YOU RATE YOUR ASTHMA CONTROL: WELL CONTROLLED

## 2022-11-11 ASSESSMENT — PAIN SCALES - GENERAL: PAINLEVEL: MILD PAIN (3)

## 2022-11-11 NOTE — PROGRESS NOTES
Assessment & Plan     Diarrhea, unspecified type  Patient is a 57-year-old male, with past medical history significant for bowel rupture of unclear etiology and subsequent sepsis approximately 1 year ago during hospitalization for COVID-19 infection, who presents to clinic due to 2 weeks of diarrhea.  Diarrhea has improved but is still present.  Vital signs normal.    Low suspicion for acute abdomen given normal vital signs and no abdominal tenderness, rebound, or guarding on exam.    Low suspicion for dehydration as diarrhea episodes have improved and only one episode today.    Differential diagnosis includes bacterial diarrhea, viral diarrhea, IBD, diverticulitis.  Given unclear etiology of bowel rupture that occurred 1 year ago as well as current symptoms will complete viral, bacterial, and C. difficile stool cultures.  We will also complete labs to look for inflammatory or other infectious causes.  Discussed symptoms that warrant urgent/emergent follow-up and return precautions.      - C. difficile Toxin B PCR with reflex to C. difficile Antigen and Toxins A/B EIA; Future  - CBC with platelets; Future  - Basic metabolic panel  (Ca, Cl, CO2, Creat, Gluc, K, Na, BUN); Future  - Enteric Bacteria and Virus Panel by JOSE MANUEL Stool; Future  - ESR: Erythrocyte sedimentation rate; Future  - CRP, inflammation; Future  - CRP, inflammation  - ESR: Erythrocyte sedimentation rate  - Basic metabolic panel  (Ca, Cl, CO2, Creat, Gluc, K, Na, BUN)  - CBC with platelets  - Enteric Bacteria and Virus Panel by JOSE MANUEL Stool  - C. difficile Toxin B PCR with reflex to C. difficile Antigen and Toxins A/B EIA      See Patient Instructions    Return in about 1 week (around 11/18/2022), or if symptoms worsen or fail to improve.    Clarice Harrell PA-C  [facility] JENIFER De La Vega is a 57 year old, presenting for the following health issues:  Gastrointestinal Problem      History of Present Illness       Reason for  visit:  Upset stomach  Symptom onset:  1-2 weeks ago  Symptoms include:  Loose stool  Symptom intensity:  Severe  Symptom progression:  Staying the same  Had these symptoms before:  No  What makes it worse:  Eating    He eats 0-1 servings of fruits and vegetables daily.He consumes 1 sweetened beverage(s) daily.He exercises with enough effort to increase his heart rate 10 to 19 minutes per day.  He exercises with enough effort to increase his heart rate 4 days per week.   He is taking medications regularly.     Patient notes diarrhea ongoing for the last 9 days. Episodes are around 3-4 times per day. Initially, episodes were more frequent. He notes general abdominal discomfort. No melena, nausea, vomiting, fever, chills, sweats. No contacts with similar symptoms. No recent antibiotic use. Patient has been using Imodium. No history of diverticulitis or bowel disorders. History of abdominal surgery due to bowel perforation, unclear cause, with bowel resection 2021.       Review of Systems   Constitutional: Negative for chills and fever.   Respiratory: Negative for shortness of breath.    Cardiovascular: Negative for chest pain.   Gastrointestinal: Positive for diarrhea. Negative for abdominal pain, nausea and vomiting.   Skin: Negative for rash.   Neurological: Negative for dizziness, weakness and light-headedness.   Psychiatric/Behavioral: The patient is not nervous/anxious.             Objective    /75 (BP Location: Left arm, Cuff Size: Adult Large)   Pulse 63   Temp 97.2  F (36.2  C) (Tympanic)   Ht 1.829 m (6')   Wt 83.9 kg (185 lb)   SpO2 98%   BMI 25.09 kg/m    Body mass index is 25.09 kg/m .  Physical Exam  Vitals and nursing note reviewed.   Constitutional:       General: He is not in acute distress.     Appearance: Normal appearance.   HENT:      Head: Normocephalic and atraumatic.   Eyes:      Extraocular Movements: Extraocular movements intact.      Pupils: Pupils are equal, round, and reactive  to light.   Cardiovascular:      Rate and Rhythm: Normal rate and regular rhythm.      Heart sounds: Normal heart sounds.   Pulmonary:      Effort: Pulmonary effort is normal.      Breath sounds: Normal breath sounds.   Abdominal:      General: Bowel sounds are normal.      Palpations: Abdomen is soft.      Tenderness: There is no abdominal tenderness. There is no guarding or rebound.   Musculoskeletal:         General: Normal range of motion.      Cervical back: Normal range of motion.   Skin:     General: Skin is warm and dry.   Neurological:      General: No focal deficit present.      Mental Status: He is alert.   Psychiatric:         Mood and Affect: Mood normal.         Behavior: Behavior normal.

## 2022-11-11 NOTE — PATIENT INSTRUCTIONS
For further evaluation of your diarrhea we are completing blood work as well as stool cultures.  If there are worrisome findings, we will contact you with results.  If your results are normal, we will send them in the mail.  Until we know the cause of your diarrhea, I would recommend discontinuing Imodium.  Please make sure you are getting plenty of rest, staying hydrated, and are eating regular meals.  Avoid any spicy or greasy foods as this can make diarrhea worse.  If you have concerns for dehydration, we would recommend Pedialyte.  As discussed, if you develop any severe symptoms such as fevers, severe abdominal pain, bloody stools, or profuse diarrhea, please go to the emergency department for further evaluation.  Reach out with questions or concerns.

## 2022-11-14 ENCOUNTER — TELEPHONE (OUTPATIENT)
Dept: FAMILY MEDICINE | Facility: CLINIC | Age: 57
End: 2022-11-14

## 2022-11-14 NOTE — TELEPHONE ENCOUNTER
Patient returned call. Notified him of lab result message from Clarice Harrell PA-C. He verbalized understanding. States that he is feeling a lot better and diarrhea is gone now.     Janay Snell RN   [facility]

## 2022-11-14 NOTE — TELEPHONE ENCOUNTER
Called [phone] (home)     Did they answer the phone: No, left a message on voicemail to return call to the [facility] at [phone].      Melanie RN,BSN  Triage Nurse  [facility]: [facility]

## 2022-11-14 NOTE — TELEPHONE ENCOUNTER
----- Message from Clarice Harrell PA-C sent at 11/14/2022  1:00 PM CST -----  Team - please call patient with results.  Please let patient know that inflammatory markers, electrolytes, kidney function, and blood cell counts are normal.  If he continues to experience diarrhea he should complete stool cultures as discussed.    Clarice Harrell PA-C on 11/14/2022 at 1:00 PM

## 2022-12-23 ENCOUNTER — OFFICE VISIT (OUTPATIENT)
Dept: FAMILY MEDICINE | Facility: CLINIC | Age: 57
End: 2022-12-23
Payer: COMMERCIAL

## 2022-12-23 VITALS
WEIGHT: 192 LBS | HEIGHT: 72 IN | OXYGEN SATURATION: 98 % | SYSTOLIC BLOOD PRESSURE: 110 MMHG | HEART RATE: 68 BPM | BODY MASS INDEX: 26.01 KG/M2 | RESPIRATION RATE: 18 BRPM | DIASTOLIC BLOOD PRESSURE: 68 MMHG | TEMPERATURE: 97.2 F

## 2022-12-23 DIAGNOSIS — R19.4 CHANGE IN BOWEL HABIT: Primary | ICD-10-CM

## 2022-12-23 PROCEDURE — 99213 OFFICE O/P EST LOW 20 MIN: CPT | Performed by: FAMILY MEDICINE

## 2022-12-23 ASSESSMENT — ENCOUNTER SYMPTOMS: ABDOMINAL PAIN: 1

## 2022-12-23 ASSESSMENT — PAIN SCALES - GENERAL: PAINLEVEL: NO PAIN (0)

## 2022-12-23 NOTE — PATIENT INSTRUCTIONS
This could be from the supplement.     Stop the supplement and see if the stools get better.     If there's no improvement, call about we discuss what needs to be done.

## 2022-12-23 NOTE — PROGRESS NOTES
Assessment & Plan     (R19.4) Change in bowel habit  (primary encounter diagnosis)  Comment: Patient notes loose stools, does not sound infectious or secondary to inflammatory bowel disease.  With further questioning, patient does remember a change in his dietary supplements.  In the past he had been taking just a zinc supplement, but may have started a combination supplement that included magnesium.  This correlated with the onset of his loose stools.  Patient is current with his colonoscopy  Plan: Reviewed options, advised that he discontinue his supplement and see if his stools improved.  If his bowel movements returned to normal, no further work-up needed.  If they persist, advised to call and we could formulate a plan to address his loose stools.    Patient Instructions   This could be from the supplement.     Stop the supplement and see if the stools get better.     If there's no improvement, call about we discuss what needs to be done.      956}     BMI:   Estimated body mass index is 26.04 kg/m  as calculated from the following:    Height as of this encounter: 1.829 m (6').    Weight as of this encounter: 87.1 kg (192 lb).     Return in about 1 month (around 1/23/2023), or if symptoms worsen or fail to improve.    Winnie Silverman MD  [facility] JENIFER De La Vega is a 57 year old, presenting for the following health issues:  Abdominal Pain      History of Present Illness       Reason for visit:  Stomach concerns  Symptom onset:  More than a month  Symptoms include:  Like hungry - comes and goes  Symptom intensity:  Moderate  Symptom progression:  Staying the same    He eats 0-1 servings of fruits and vegetables daily.He consumes 1 sweetened beverage(s) daily.He exercises with enough effort to increase his heart rate 20 to 29 minutes per day.  He exercises with enough effort to increase his heart rate 3 or less days per week.   He is taking medications regularly.        Diarrhea  Onset/Duration: More than a month ago  Description:       Consistency of stool: loose and but not runny       Blood in stool: No       Number of loose stools past 24 hours: 1 in last hpur  Progression of Symptoms: intermittent  Accompanying signs and symptoms:       Fever: No       Nausea/Vomiting: No       Abdominal pain: YES       Weight loss: No       Episodes of constipation: No  History   Ill contacts: No  Recent use of antibiotics: No  Recent travels: No  Recent medication-new or changes(Rx or OTC): No  Precipitating or alleviating factors: None  Therapies tried and outcome: none        Review of Systems   CONSTITUTIONAL: NEGATIVE for fever, chills, change in weight  ENT/MOUTH: NEGATIVE for ear, mouth and throat problems  RESP: NEGATIVE for significant cough or SOB  CV: NEGATIVE for chest pain, palpitations or peripheral edema  GI: See above.      Objective    /68   Pulse 68   Temp 97.2  F (36.2  C) (Tympanic)   Resp 18   Ht 1.829 m (6')   Wt 87.1 kg (192 lb)   SpO2 98%   BMI 26.04 kg/m    Body mass index is 26.04 kg/m .  Physical Exam   GENERAL: Healthy, alert and no distress  EYES: Eyes grossly normal to inspection, conjunctivae and sclerae normal  RESP: Lungs clear to auscultation - no rales, rhonchi or wheezes  CV: Regular rate and rhythm, normal S1 S2, no murmur  GI:  soft, nontender, no HSM   MS: No gross musculoskeletal defects noted, no edema  NEURO: Normal strength and tone, mentation intact and speech normal  PSYCH: Mentation appears normal, affect normal/bright     Winnie Silverman MD

## 2023-03-03 DIAGNOSIS — R39.11 HESITANCY OF MICTURITION: ICD-10-CM

## 2023-03-03 RX ORDER — TAMSULOSIN HYDROCHLORIDE 0.4 MG/1
CAPSULE ORAL
Qty: 180 CAPSULE | Refills: 1 | Status: SHIPPED | OUTPATIENT
Start: 2023-03-03 | End: 2024-04-16

## 2023-04-14 ENCOUNTER — OFFICE VISIT (OUTPATIENT)
Dept: FAMILY MEDICINE | Facility: CLINIC | Age: 58
End: 2023-04-14
Payer: COMMERCIAL

## 2023-04-14 VITALS
WEIGHT: 196.8 LBS | OXYGEN SATURATION: 95 % | SYSTOLIC BLOOD PRESSURE: 125 MMHG | DIASTOLIC BLOOD PRESSURE: 78 MMHG | HEART RATE: 60 BPM | BODY MASS INDEX: 26.66 KG/M2 | TEMPERATURE: 97.7 F | HEIGHT: 72 IN

## 2023-04-14 DIAGNOSIS — Z13.6 ENCOUNTER FOR LIPID SCREENING FOR CARDIOVASCULAR DISEASE: ICD-10-CM

## 2023-04-14 DIAGNOSIS — Z00.00 ROUTINE GENERAL MEDICAL EXAMINATION AT A HEALTH CARE FACILITY: Primary | ICD-10-CM

## 2023-04-14 DIAGNOSIS — Z87.19 HISTORY OF SMALL BOWEL OBSTRUCTION: ICD-10-CM

## 2023-04-14 DIAGNOSIS — J45.30 MILD PERSISTENT ASTHMA WITHOUT COMPLICATION: ICD-10-CM

## 2023-04-14 DIAGNOSIS — Z12.5 SCREENING FOR PROSTATE CANCER: ICD-10-CM

## 2023-04-14 DIAGNOSIS — Z23 NEED FOR VACCINATION: ICD-10-CM

## 2023-04-14 DIAGNOSIS — Z13.220 ENCOUNTER FOR LIPID SCREENING FOR CARDIOVASCULAR DISEASE: ICD-10-CM

## 2023-04-14 DIAGNOSIS — R10.84 ABDOMINAL PAIN, GENERALIZED: ICD-10-CM

## 2023-04-14 LAB
ALBUMIN SERPL-MCNC: 3.9 G/DL (ref 3.4–5)
ALP SERPL-CCNC: 57 U/L (ref 40–150)
ALT SERPL W P-5'-P-CCNC: 21 U/L (ref 0–70)
ANION GAP SERPL CALCULATED.3IONS-SCNC: 1 MMOL/L (ref 3–14)
AST SERPL W P-5'-P-CCNC: 17 U/L (ref 0–45)
BILIRUB SERPL-MCNC: 0.6 MG/DL (ref 0.2–1.3)
BUN SERPL-MCNC: 19 MG/DL (ref 7–30)
CALCIUM SERPL-MCNC: 9 MG/DL (ref 8.5–10.1)
CHLORIDE BLD-SCNC: 110 MMOL/L (ref 94–109)
CHOLEST SERPL-MCNC: 158 MG/DL
CO2 SERPL-SCNC: 27 MMOL/L (ref 20–32)
CREAT SERPL-MCNC: 0.84 MG/DL (ref 0.66–1.25)
FASTING STATUS PATIENT QL REPORTED: YES
GFR SERPL CREATININE-BSD FRML MDRD: >90 ML/MIN/1.73M2
GLUCOSE BLD-MCNC: 96 MG/DL (ref 70–99)
HBA1C MFR BLD: 5.5 % (ref 0–5.6)
HDLC SERPL-MCNC: 63 MG/DL
LDLC SERPL CALC-MCNC: 82 MG/DL
NONHDLC SERPL-MCNC: 95 MG/DL
POTASSIUM BLD-SCNC: 4.6 MMOL/L (ref 3.4–5.3)
PROT SERPL-MCNC: 6.8 G/DL (ref 6.8–8.8)
PSA SERPL-MCNC: 0.91 UG/L (ref 0–4)
SODIUM SERPL-SCNC: 138 MMOL/L (ref 133–144)
TRIGL SERPL-MCNC: 66 MG/DL

## 2023-04-14 PROCEDURE — 80061 LIPID PANEL: CPT | Performed by: FAMILY MEDICINE

## 2023-04-14 PROCEDURE — 90750 HZV VACC RECOMBINANT IM: CPT | Performed by: FAMILY MEDICINE

## 2023-04-14 PROCEDURE — G0103 PSA SCREENING: HCPCS | Performed by: FAMILY MEDICINE

## 2023-04-14 PROCEDURE — 83036 HEMOGLOBIN GLYCOSYLATED A1C: CPT | Performed by: FAMILY MEDICINE

## 2023-04-14 PROCEDURE — 36415 COLL VENOUS BLD VENIPUNCTURE: CPT | Performed by: FAMILY MEDICINE

## 2023-04-14 PROCEDURE — 99213 OFFICE O/P EST LOW 20 MIN: CPT | Mod: 25 | Performed by: FAMILY MEDICINE

## 2023-04-14 PROCEDURE — 90471 IMMUNIZATION ADMIN: CPT | Performed by: FAMILY MEDICINE

## 2023-04-14 PROCEDURE — 90677 PCV20 VACCINE IM: CPT | Performed by: FAMILY MEDICINE

## 2023-04-14 PROCEDURE — 80053 COMPREHEN METABOLIC PANEL: CPT | Performed by: FAMILY MEDICINE

## 2023-04-14 PROCEDURE — 90472 IMMUNIZATION ADMIN EACH ADD: CPT | Performed by: FAMILY MEDICINE

## 2023-04-14 PROCEDURE — 99396 PREV VISIT EST AGE 40-64: CPT | Mod: 25 | Performed by: FAMILY MEDICINE

## 2023-04-14 RX ORDER — FLUTICASONE PROPIONATE 220 UG/1
2 AEROSOL, METERED RESPIRATORY (INHALATION) 2 TIMES DAILY
Qty: 12 G | Refills: 11 | Status: SHIPPED | OUTPATIENT
Start: 2023-04-14 | End: 2023-04-14

## 2023-04-14 RX ORDER — BUDESONIDE AND FORMOTEROL FUMARATE DIHYDRATE 160; 4.5 UG/1; UG/1
2 AEROSOL RESPIRATORY (INHALATION) 2 TIMES DAILY
Qty: 10.2 G | Refills: 11 | Status: SHIPPED | OUTPATIENT
Start: 2023-04-14 | End: 2024-04-16

## 2023-04-14 ASSESSMENT — ENCOUNTER SYMPTOMS
HEMATOCHEZIA: 0
NERVOUS/ANXIOUS: 0
DIARRHEA: 0
ABDOMINAL PAIN: 0
FREQUENCY: 0
NAUSEA: 0
ARTHRALGIAS: 0
HEMATURIA: 0
CONSTIPATION: 0
SORE THROAT: 0
HEARTBURN: 0
SHORTNESS OF BREATH: 0
PARESTHESIAS: 0
DYSURIA: 0
MYALGIAS: 0
CHILLS: 0
FEVER: 0
HEADACHES: 0
EYE PAIN: 0
WEAKNESS: 0
PALPITATIONS: 0
COUGH: 0
JOINT SWELLING: 0
DIZZINESS: 0

## 2023-04-14 NOTE — PROGRESS NOTES
SUBJECTIVE:   CC: Cefeirno is an 57 year old who presents for preventative health visit.       4/14/2023     8:23 AM   Additional Questions   Roomed by Xochitl     Patient has been advised of split billing requirements and indicates understanding: Yes  Healthy Habits:     Getting at least 3 servings of Calcium per day:  NO    Bi-annual eye exam:  Yes    Dental care twice a year:  Yes    Sleep apnea or symptoms of sleep apnea:  None    Diet:  Regular (no restrictions)    Frequency of exercise:  2-3 days/week    Duration of exercise:  Less than 15 minutes    Taking medications regularly:  Yes    Medication side effects:  None    PHQ-2 Total Score: 0    Additional concerns today:  No    Patient presents for physical.  Patient has a history of small bowel obstruction with perforation.  He has had some discomfort around his surgical scar for some time now.  No change in stool.  No nausea or vomiting.  Appetite is normal.  CT scan done last year showed scar tissue in the area.    Patient has a history of asthma.  Needs refill of Flovent, however $80 a month.              Today's PHQ-2 Score:       4/14/2023     8:06 AM   PHQ-2 ( 1999 [employer])   Q1: Little interest or pleasure in doing things 0   Q2: Feeling down, depressed or hopeless 0   PHQ-2 Score 0   Q1: Little interest or pleasure in doing things Not at all    Not at all   Q2: Feeling down, depressed or hopeless Not at all    Not at all   PHQ-2 Score 0    0           Social History     Tobacco Use     Smoking status: Never     Passive exposure: Never     Smokeless tobacco: Never     Tobacco comments:     Lives in smoke free household   Vaping Use     Vaping status: Never Used     Passive vaping exposure: Yes   Substance Use Topics     Alcohol use: Yes     Comment: Social drinks occasionally.             4/14/2023     8:06 AM   Alcohol Use   Prescreen: >3 drinks/day or >7 drinks/week? No          View : No data to display.                Last PSA:   PSA   Date Value Ref  "Range Status   12/28/2020 0.85 0 - 4 ug/L Final     Comment:     Assay Method:  Chemiluminescence using Siemens Vista analyzer     Prostate Specific Antigen Screen   Date Value Ref Range Status   04/01/2022 0.70 0.00 - 4.00 ug/L Final       Reviewed orders with patient. Reviewed health maintenance and updated orders accordingly - Yes  BP Readings from Last 3 Encounters:   04/14/23 125/78   12/23/22 110/68   11/11/22 124/75    Wt Readings from Last 3 Encounters:   04/14/23 89.3 kg (196 lb 12.8 oz)   12/23/22 87.1 kg (192 lb)   11/11/22 83.9 kg (185 lb)                    Reviewed and updated as needed this visit by clinical staff    Allergies  Meds              Reviewed and updated as needed this visit by Provider                     Review of Systems   Constitutional: Negative for chills and fever.   HENT: Negative for congestion, ear pain, hearing loss and sore throat.    Eyes: Negative for pain and visual disturbance.   Respiratory: Negative for cough and shortness of breath.    Cardiovascular: Negative for chest pain, palpitations and peripheral edema.   Gastrointestinal: Negative for abdominal pain, constipation, diarrhea, heartburn, hematochezia and nausea.   Genitourinary: Negative for dysuria, frequency, genital sores, hematuria, impotence and urgency.   Musculoskeletal: Negative for arthralgias, joint swelling and myalgias.   Skin: Negative for rash.   Neurological: Negative for dizziness, weakness, headaches and paresthesias.   Psychiatric/Behavioral: Negative for mood changes. The patient is not nervous/anxious.          OBJECTIVE:   /78   Pulse 60   Temp 97.7  F (36.5  C) (Oral)   Ht 1.837 m (6' 0.32\")   Wt 89.3 kg (196 lb 12.8 oz)   SpO2 95%   BMI 26.45 kg/m      Physical Exam  Vitals reviewed.   Constitutional:       Appearance: Normal appearance. He is not ill-appearing.   HENT:      Head: Normocephalic.      Right Ear: Tympanic membrane and ear canal normal.      Left Ear: Tympanic " membrane and ear canal normal.      Nose: Nose normal.   Eyes:      Extraocular Movements: Extraocular movements intact.   Cardiovascular:      Rate and Rhythm: Normal rate and regular rhythm.      Heart sounds: Normal heart sounds. No murmur heard.  Pulmonary:      Effort: Pulmonary effort is normal. No respiratory distress.      Breath sounds: Normal breath sounds. No wheezing or rales.   Abdominal:      General: Abdomen is flat. Bowel sounds are normal. There is no distension.      Palpations: Abdomen is soft.      Tenderness: There is abdominal tenderness. There is no guarding.      Hernia: No hernia is present.   Musculoskeletal:         General: Normal range of motion.      Cervical back: Normal range of motion and neck supple.   Skin:     General: Skin is warm and dry.      Findings: No lesion.      Comments: Surgical scar mid abdomen   Neurological:      Mental Status: He is alert and oriented to person, place, and time.   Psychiatric:         Mood and Affect: Mood normal.         Behavior: Behavior normal.         Thought Content: Thought content normal.         Judgment: Judgment normal.               ASSESSMENT/PLAN:       ICD-10-CM    1. Routine general medical examination at a health care facility  Z00.00 Lipid panel reflex to direct LDL Fasting     Comprehensive metabolic panel     Hemoglobin A1c     Lipid panel reflex to direct LDL Fasting     Comprehensive metabolic panel     Hemoglobin A1c      2. Mild persistent asthma without complication  J45.30 budesonide-formoterol (SYMBICORT) 160-4.5 MCG/ACT Inhaler     DISCONTINUED: fluticasone (FLOVENT HFA) 220 MCG/ACT inhaler     DISCONTINUED: fluticasone (FLOVENT HFA) 220 MCG/ACT inhaler      3. Abdominal pain, generalized  R10.84 CT Abdomen Pelvis w Contrast      4. History of small bowel obstruction  Z87.19 CT Abdomen Pelvis w Contrast      5. Encounter for lipid screening for cardiovascular disease  Z13.220 Lipid panel reflex to direct LDL Fasting     "Z13.6 Lipid panel reflex to direct LDL Fasting      6. Screening for prostate cancer  Z12.5 Prostate Specific Antigen Screen     Prostate Specific Antigen Screen      7. Need for vaccination  Z23 ZOSTER VACCINE RECOMBINANT ADJUVANTED (SHINGRIX)     Pneumococcal 20 Valent Conjugate (Prevnar 20)        Slight midepigastric tenderness on abdominal exam, given his history recommend CT of his abdomen.  Discussed that this is most likely scar tissue/adhesions  We will change inhaler to Symbicort, hopefully this is covered a little bit better by insurance plan.  Patient has been advised of split billing requirements and indicates understanding: Yes      COUNSELING:   Reviewed preventive health counseling, as reflected in patient instructions       Regular exercise       Healthy diet/nutrition      BMI:   Estimated body mass index is 26.45 kg/m  as calculated from the following:    Height as of this encounter: 1.837 m (6' 0.32\").    Weight as of this encounter: 89.3 kg (196 lb 12.8 oz).   Weight management plan: Discussed healthy diet and exercise guidelines      He reports that he has never smoked. He has never been exposed to tobacco smoke. He has never used smokeless tobacco.            Arturo Cagle MD  [facility]  "

## 2023-04-14 NOTE — PATIENT INSTRUCTIONS
Ear wax removal - debrox    Imaging Services   [facility] Scheduling  [phone]  Hours M-F: 8:00 AM-5:00 PM      Call above # to schedule your abdominal CT scan    Preventive Health Recommendations  Male Ages 50 - 64    Yearly exam:             See your health care provider every year in order to  o   Review health changes.   o   Discuss preventive care.    o   Review your medicines if your doctor has prescribed any.   Have a cholesterol test every 5 years, or more frequently if you are at risk for high cholesterol/heart disease.   Have a diabetes test (fasting glucose) every three years. If you are at risk for diabetes, you should have this test more often.   Have a colonoscopy at age 50, or have a yearly FIT test (stool test). These exams will check for colon cancer.    Talk with your health care provider about whether or not a prostate cancer screening test (PSA) is right for you.  You should be tested each year for STDs (sexually transmitted diseases), if you re at risk.     Shots: Get a flu shot each year. Get a tetanus shot every 10 years.     Nutrition:  Eat at least 5 servings of fruits and vegetables daily.   Eat whole-grain bread, whole-wheat pasta and brown rice instead of white grains and rice.   Get adequate Calcium and Vitamin D.     Lifestyle  Exercise for at least 150 minutes a week (30 minutes a day, 5 days a week). This will help you control your weight and prevent disease.   Limit alcohol to one drink per day.   No smoking.   Wear sunscreen to prevent skin cancer.   See your dentist every six months for an exam and cleaning.   See your eye doctor every 1 to 2 years.

## 2023-04-14 NOTE — LETTER
April 17, 2023    Ceferino GALILEO Willa  [address] [facility] [address]          Dear ,    We are writing to inform you of your test results.  Your labs are not concerning.  Your Liver function, kidney function and electrolytes are all normal.  Your PSA (prostate cancer screen) was normal.  You have a healthy cholesterol panel. Please let me know if you have any questions      Resulted Orders   Lipid panel reflex to direct LDL Fasting   Result Value Ref Range    Cholesterol 158 <200 mg/dL    Triglycerides 66 <150 mg/dL    Direct Measure HDL 63 >=40 mg/dL    LDL Cholesterol Calculated 82 <=100 mg/dL    Non HDL Cholesterol 95 <130 mg/dL    Patient Fasting > 8hrs? Yes     Narrative    Cholesterol  Desirable:  <200 mg/dL    Triglycerides  Normal:  Less than 150 mg/dL  Borderline High:  150-199 mg/dL  High:  200-499 mg/dL  Very High:  Greater than or equal to 500 mg/dL    Direct Measure HDL  Female:  Greater than or equal to 50 mg/dL   Male:  Greater than or equal to 40 mg/dL    LDL Cholesterol  Desirable:  <100mg/dL  Above Desirable:  100-129 mg/dL   Borderline High:  130-159 mg/dL   High:  160-189 mg/dL   Very High:  >= 190 mg/dL    Non HDL Cholesterol  Desirable:  130 mg/dL  Above Desirable:  130-159 mg/dL  Borderline High:  160-189 mg/dL  High:  190-219 mg/dL  Very High:  Greater than or equal to 220 mg/dL   Comprehensive metabolic panel   Result Value Ref Range    Sodium 138 133 - 144 mmol/L    Potassium 4.6 3.4 - 5.3 mmol/L    Chloride 110 (H) 94 - 109 mmol/L    Carbon Dioxide (CO2) 27 20 - 32 mmol/L    Anion Gap 1 (L) 3 - 14 mmol/L    Urea Nitrogen 19 7 - 30 mg/dL    Creatinine 0.84 0.66 - 1.25 mg/dL    Calcium 9.0 8.5 - 10.1 mg/dL    Glucose 96 70 - 99 mg/dL    Alkaline Phosphatase 57 40 - 150 U/L    AST 17 0 - 45 U/L    ALT 21 0 - 70 U/L    Protein Total 6.8 6.8 - 8.8 g/dL    Albumin 3.9 3.4 - 5.0 g/dL    Bilirubin Total 0.6 0.2 - 1.3 mg/dL    GFR Estimate >90 >60 mL/min/1.73m2      Comment:      eGFR  calculated using 2021 CKD-EPI equation.   Hemoglobin A1c   Result Value Ref Range    Hemoglobin A1C 5.5 0.0 - 5.6 %      Comment:      Normal <5.7%   Prediabetes 5.7-6.4%    Diabetes 6.5% or higher     Note: Adopted from ADA consensus guidelines.   Prostate Specific Antigen Screen   Result Value Ref Range    Prostate Specific Antigen Screen 0.91 0.00 - 4.00 ug/L       If you have any questions or concerns, please call the clinic at the number listed above.       Sincerely,      Arturo Coffey MD

## 2023-04-18 ENCOUNTER — TRANSFERRED RECORDS (OUTPATIENT)
Dept: MULTI SPECIALTY CLINIC | Facility: CLINIC | Age: 58
End: 2023-04-18

## 2023-04-18 LAB — RETINOPATHY: NORMAL

## 2023-04-21 ENCOUNTER — ANCILLARY PROCEDURE (OUTPATIENT)
Dept: CT IMAGING | Facility: CLINIC | Age: 58
End: 2023-04-21
Attending: FAMILY MEDICINE
Payer: COMMERCIAL

## 2023-04-21 DIAGNOSIS — R10.84 ABDOMINAL PAIN, GENERALIZED: ICD-10-CM

## 2023-04-21 DIAGNOSIS — Z87.19 HISTORY OF SMALL BOWEL OBSTRUCTION: ICD-10-CM

## 2023-04-21 PROCEDURE — 74177 CT ABD & PELVIS W/CONTRAST: CPT | Mod: TC | Performed by: RADIOLOGY

## 2023-04-21 RX ORDER — IOPAMIDOL 755 MG/ML
120 INJECTION, SOLUTION INTRAVASCULAR ONCE
Status: COMPLETED | OUTPATIENT
Start: 2023-04-21 | End: 2023-04-21

## 2023-04-21 RX ADMIN — IOPAMIDOL 120 ML: 755 INJECTION, SOLUTION INTRAVASCULAR at 11:50

## 2023-07-20 ENCOUNTER — TELEPHONE (OUTPATIENT)
Dept: FAMILY MEDICINE | Facility: CLINIC | Age: 58
End: 2023-07-20
Payer: COMMERCIAL

## 2023-07-20 DIAGNOSIS — J45.30 MILD PERSISTENT ASTHMA WITHOUT COMPLICATION: Primary | ICD-10-CM

## 2023-07-20 NOTE — TELEPHONE ENCOUNTER
Received a fax from the patient's pharmacy for a alternative medication    Medication ordered  Flovent  MCG INHALER  SIG: INHALE 2 PUFFS INTO LUNGS TWICE A DAY    Alternative Requested    [facility]    Pharmacy  [facility]  [address] [place] [address]  ARNALDO Dailey [address]  Phone:[phone]  Fax:[phone]    Mariluz Yost [facility]

## 2024-01-09 NOTE — TELEPHONE ENCOUNTER
Reason for Call:  Other appointment    Detailed comments: [facility] infections disease calling to ask Dr. Bunch to contact patient regarding what he should be doing diet torres and also if Dr. Bunch wanted to see him in clinic- since Dr. Bunch doesn't have a regular clinic, will be forwarding this on to primary to follow up on this.     Phone Number Patient can be reached at: Home number on file [phone] (home)    Best Time: any    Can we leave a detailed message on this number? YES    Call taken on 1/6/2022 at 2:07 PM by Angie Turner      
0.4 11/28/2023    ALKPHOS 133 (H) 11/28/2023    AST 28 11/28/2023    ALT 30 11/28/2023    LABGLOM >60 11/28/2023    GFRAA >60 08/29/2022    AGRATIO 1.5 11/28/2023    GLOB 2.4 10/07/2021           ASSESSMENT/PLAN:   Diagnosis Orders   1. Right lower quadrant abdominal pain  [facility] Haresh Bustillos MD, Gastroenterology (ERCP & EUS), [facility]    CT ABDOMEN PELVIS W IV CONTRAST Additional Contrast? Radiologist Recommendation      2. Smoker  DISCONTINUED: nicotine polacrilex (NICORETTE) 4 MG gum      3. Pulmonary emphysema, unspecified emphysema type (HCC)        4. Mixed anxiety and depressive disorder  mirtazapine (REMERON) 7.5 MG tablet      5. Benign essential hypertension  amLODIPine (NORVASC) 5 MG tablet      Right lower quadrant abdominal pain   Acute on chronic pain in RLQ.  History of malrotation/internal hernia   5/21/20 CT abdonmen 1. Chronic laxity of the inguinal canal internal ring bilaterally with mild herniation of fat.     .- Referral to general surgery for treatment of hernia  - Referral to gastroenterology and repeat CT abdomen/pelvis        Benign essential hypertension  Controlled, compliant with medications, Valsartan 80mg daily and amlodipine 5mg (did not tolerate metoprolol per EMR)  -Continue current medications    Mixed anxiety and depressive disorder  Discussed, mirtazapine 7.5 mg daily for sleep, and  Cymbalta 60mg twice daily, and increased BuSpar 15 mg twice daily for anxiety. Recently prescribed risperdone 0.5 mg daily.   0.5 Sees psychiatry   -Continue current medication      Smoker/ COPD   Improved, has decreased use, 3 cigarettes daily.  Currently on Chantix and interested in nicotine gum  COPD controlled on current inhalers, continue current treatment    - greater than 3 minutes spent on smoking and tobacco use cessation. Readiness to quit: interested  Discussed treatment options with nicotine replacement/chantix/bupropion.         An electronic signature was used to authenticate

## 2024-02-10 ENCOUNTER — TRANSFERRED RECORDS (OUTPATIENT)
Dept: MULTI SPECIALTY CLINIC | Facility: CLINIC | Age: 59
End: 2024-02-10

## 2024-02-10 LAB — RETINOPATHY: NORMAL

## 2024-02-13 ENCOUNTER — TELEPHONE (OUTPATIENT)
Dept: FAMILY MEDICINE | Facility: CLINIC | Age: 59
End: 2024-02-13
Payer: COMMERCIAL

## 2024-02-13 NOTE — TELEPHONE ENCOUNTER
"Liana,  at [facility] home health calling.   She would like to update, that patients referral was received from a hospital stay. She spoke with the patient to schedule but he declined to have home health come into home. She is going to rufus this as \"non-admit\" at this time. This was for nursing and occupational therapy.     Josephine Jovel RN on 2/13/2024 at 11:25 AM    "

## 2024-02-23 ENCOUNTER — OFFICE VISIT (OUTPATIENT)
Dept: FAMILY MEDICINE | Facility: CLINIC | Age: 59
End: 2024-02-23
Payer: COMMERCIAL

## 2024-02-23 VITALS
DIASTOLIC BLOOD PRESSURE: 78 MMHG | SYSTOLIC BLOOD PRESSURE: 117 MMHG | OXYGEN SATURATION: 96 % | BODY MASS INDEX: 27.59 KG/M2 | WEIGHT: 208.2 LBS | HEART RATE: 61 BPM | HEIGHT: 73 IN | TEMPERATURE: 97.4 F | RESPIRATION RATE: 16 BRPM

## 2024-02-23 DIAGNOSIS — H53.461 RIGHT HOMONYMOUS HEMIANOPSIA: ICD-10-CM

## 2024-02-23 DIAGNOSIS — H54.7 VISION IMPAIRMENT: Primary | ICD-10-CM

## 2024-02-23 DIAGNOSIS — I63.9 CEREBROVASCULAR ACCIDENT (CVA), UNSPECIFIED MECHANISM (H): ICD-10-CM

## 2024-02-23 PROCEDURE — 99213 OFFICE O/P EST LOW 20 MIN: CPT | Performed by: FAMILY MEDICINE

## 2024-02-23 RX ORDER — OXYCODONE HYDROCHLORIDE 5 MG/1
2.5 TABLET ORAL
COMMUNITY
Start: 2024-02-10 | End: 2024-04-16

## 2024-02-23 RX ORDER — CLOPIDOGREL BISULFATE 75 MG/1
75 TABLET ORAL DAILY
COMMUNITY
Start: 2024-02-11 | End: 2024-04-16

## 2024-02-23 RX ORDER — ATORVASTATIN CALCIUM 40 MG/1
1 TABLET, FILM COATED ORAL DAILY
COMMUNITY
Start: 2024-02-11 | End: 2024-04-16

## 2024-02-23 ASSESSMENT — ASTHMA QUESTIONNAIRES
ACT_TOTALSCORE: 24
QUESTION_4 LAST FOUR WEEKS HOW OFTEN HAVE YOU USED YOUR RESCUE INHALER OR NEBULIZER MEDICATION (SUCH AS ALBUTEROL): NOT AT ALL
QUESTION_2 LAST FOUR WEEKS HOW OFTEN HAVE YOU HAD SHORTNESS OF BREATH: NOT AT ALL
ACT_TOTALSCORE: 24
QUESTION_1 LAST FOUR WEEKS HOW MUCH OF THE TIME DID YOUR ASTHMA KEEP YOU FROM GETTING AS MUCH DONE AT WORK, SCHOOL OR AT HOME: NONE OF THE TIME
QUESTION_3 LAST FOUR WEEKS HOW OFTEN DID YOUR ASTHMA SYMPTOMS (WHEEZING, COUGHING, SHORTNESS OF BREATH, CHEST TIGHTNESS OR PAIN) WAKE YOU UP AT NIGHT OR EARLIER THAN USUAL IN THE MORNING: NOT AT ALL
QUESTION_5 LAST FOUR WEEKS HOW WOULD YOU RATE YOUR ASTHMA CONTROL: WELL CONTROLLED

## 2024-02-23 NOTE — PROGRESS NOTES
"  Assessment & Plan       ICD-10-CM    1. Vision impairment  H54.7 Adult Eye  Referral     CANCELED: Adult Eye  Referral      2. Cerebrovascular accident (CVA), unspecified mechanism (H)  I63.9 Adult Eye  Referral     CANCELED: Adult Eye  Referral      3. Right homonymous hemianopsia  H53.461             Review of external notes as documented elsewhere in note      MED REC REQUIRED  Post Medication Reconciliation Status:     BMI  Estimated body mass index is 27.74 kg/m  as calculated from the following:    Height as of this encounter: 1.845 m (6' 0.64\").    Weight as of this encounter: 94.4 kg (208 lb 3.2 oz).   Weight management plan: Discussed healthy diet and exercise guidelines      There are no Patient Instructions on file for this visit.    Forrest De La Vega is a 58 year old, presenting for the following health issues:  Hospital F/U        2/23/2024    10:18 AM   Additional Questions   Roomed by Xochitl   Accompanied by none         2/23/2024    10:18 AM   Patient Reported Additional Medications   Patient reports taking the following new medications clopidogrel (PLAVIX) 75 mg , atorvastatin (LIPITOR) 40 mg      Via the Health Maintenance questionnaire, the patient has reported the following services have been completed -Eye Exam, this information has been sent to the abstraction team.  [facility]         Hospital Follow-up Visit:    Hospital/Nursing Home/ Rehab Facility:  [facility]  Date of Admission: 2/08/2024  Date of Discharge: 2/10/2024  Reason(s) for Admission: Stroke    Was your hospitalization related to COVID-19? No   Problems taking medications regularly:  None  Medication changes since discharge: None  Problems adhering to non-medication therapy:  None    Summary of hospitalization:  [facility] discharge summary reviewed  Diagnostic Tests/Treatments reviewed.  Follow up needed: none  Other Healthcare Providers Involved in Patient s Care:         " "None  Update since discharge: improved.       CVA left occipital lobe  Started on ASA, plavix, lipitor  Neurology following, MYRIAM planned  Lateral vision impairment in right eye.      Plan of care communicated with patient                   Review of Systems  Constitutional, HEENT, cardiovascular, pulmonary, gi and gu systems are negative, except as otherwise noted.      Objective    /78   Pulse 61   Temp 97.4  F (36.3  C) (Temporal)   Resp 16   Ht 1.845 m (6' 0.64\")   Wt 94.4 kg (208 lb 3.2 oz)   SpO2 96%   BMI 27.74 kg/m    Body mass index is 27.74 kg/m .  Physical Exam  Constitutional:       General: He is not in acute distress.     Appearance: Normal appearance. He is well-developed. He is not ill-appearing.   HENT:      Head: Normocephalic and atraumatic.      Right Ear: External ear normal.      Left Ear: External ear normal.      Nose: Nose normal.   Eyes:      General: No scleral icterus.     Extraocular Movements: Extraocular movements intact.      Conjunctiva/sclera: Conjunctivae normal.   Cardiovascular:      Rate and Rhythm: Normal rate.   Pulmonary:      Effort: Pulmonary effort is normal.   Musculoskeletal:      Cervical back: Normal range of motion and neck supple.   Skin:     General: Skin is warm and dry.   Neurological:      Mental Status: He is alert and oriented to person, place, and time.   Psychiatric:         Behavior: Behavior normal.         Thought Content: Thought content normal.         Judgment: Judgment normal.                    Signed Electronically by: Arturo Cagle MD    "

## 2024-04-16 ENCOUNTER — OFFICE VISIT (OUTPATIENT)
Dept: FAMILY MEDICINE | Facility: CLINIC | Age: 59
End: 2024-04-16
Payer: COMMERCIAL

## 2024-04-16 VITALS
RESPIRATION RATE: 16 BRPM | DIASTOLIC BLOOD PRESSURE: 87 MMHG | HEART RATE: 75 BPM | WEIGHT: 203 LBS | BODY MASS INDEX: 27.5 KG/M2 | HEIGHT: 72 IN | SYSTOLIC BLOOD PRESSURE: 131 MMHG | TEMPERATURE: 97.1 F | OXYGEN SATURATION: 98 %

## 2024-04-16 DIAGNOSIS — R39.11 HESITANCY OF MICTURITION: ICD-10-CM

## 2024-04-16 DIAGNOSIS — Z00.00 WELL ADULT EXAM: Primary | ICD-10-CM

## 2024-04-16 DIAGNOSIS — J45.30 MILD PERSISTENT ASTHMA WITHOUT COMPLICATION: ICD-10-CM

## 2024-04-16 DIAGNOSIS — H61.22 IMPACTED CERUMEN OF LEFT EAR: ICD-10-CM

## 2024-04-16 DIAGNOSIS — N52.9 ERECTILE DYSFUNCTION, UNSPECIFIED ERECTILE DYSFUNCTION TYPE: ICD-10-CM

## 2024-04-16 DIAGNOSIS — E78.5 HYPERLIPIDEMIA LDL GOAL <70: ICD-10-CM

## 2024-04-16 DIAGNOSIS — Z12.5 SCREENING FOR PROSTATE CANCER: ICD-10-CM

## 2024-04-16 DIAGNOSIS — Z20.2 STD EXPOSURE: ICD-10-CM

## 2024-04-16 DIAGNOSIS — I63.9 CEREBROVASCULAR ACCIDENT (CVA), UNSPECIFIED MECHANISM (H): ICD-10-CM

## 2024-04-16 LAB — HBA1C MFR BLD: 5.4 % (ref 0–5.6)

## 2024-04-16 PROCEDURE — 99396 PREV VISIT EST AGE 40-64: CPT | Mod: 25 | Performed by: FAMILY MEDICINE

## 2024-04-16 PROCEDURE — 99213 OFFICE O/P EST LOW 20 MIN: CPT | Mod: 25 | Performed by: FAMILY MEDICINE

## 2024-04-16 PROCEDURE — 80053 COMPREHEN METABOLIC PANEL: CPT | Performed by: FAMILY MEDICINE

## 2024-04-16 PROCEDURE — G0103 PSA SCREENING: HCPCS | Performed by: FAMILY MEDICINE

## 2024-04-16 PROCEDURE — 80061 LIPID PANEL: CPT | Performed by: FAMILY MEDICINE

## 2024-04-16 PROCEDURE — 36415 COLL VENOUS BLD VENIPUNCTURE: CPT | Performed by: FAMILY MEDICINE

## 2024-04-16 PROCEDURE — 86696 HERPES SIMPLEX TYPE 2 TEST: CPT | Performed by: FAMILY MEDICINE

## 2024-04-16 PROCEDURE — 86695 HERPES SIMPLEX TYPE 1 TEST: CPT | Performed by: FAMILY MEDICINE

## 2024-04-16 PROCEDURE — 69210 REMOVE IMPACTED EAR WAX UNI: CPT | Mod: LT | Performed by: FAMILY MEDICINE

## 2024-04-16 PROCEDURE — 83036 HEMOGLOBIN GLYCOSYLATED A1C: CPT | Performed by: FAMILY MEDICINE

## 2024-04-16 RX ORDER — ATORVASTATIN CALCIUM 40 MG/1
40 TABLET, FILM COATED ORAL DAILY
Qty: 90 TABLET | Refills: 3 | Status: SHIPPED | OUTPATIENT
Start: 2024-04-16

## 2024-04-16 RX ORDER — SILDENAFIL 50 MG/1
50 TABLET, FILM COATED ORAL DAILY PRN
Qty: 30 TABLET | Refills: 3 | Status: SHIPPED | OUTPATIENT
Start: 2024-04-16

## 2024-04-16 RX ORDER — TAMSULOSIN HYDROCHLORIDE 0.4 MG/1
0.8 CAPSULE ORAL EVERY EVENING
Qty: 180 CAPSULE | Refills: 3 | Status: SHIPPED | OUTPATIENT
Start: 2024-04-16

## 2024-04-16 RX ORDER — CLOPIDOGREL BISULFATE 75 MG/1
75 TABLET ORAL DAILY
Qty: 90 TABLET | Refills: 1 | Status: SHIPPED | OUTPATIENT
Start: 2024-04-16

## 2024-04-16 SDOH — HEALTH STABILITY: PHYSICAL HEALTH: ON AVERAGE, HOW MANY MINUTES DO YOU ENGAGE IN EXERCISE AT THIS LEVEL?: 0 MIN

## 2024-04-16 SDOH — HEALTH STABILITY: PHYSICAL HEALTH: ON AVERAGE, HOW MANY DAYS PER WEEK DO YOU ENGAGE IN MODERATE TO STRENUOUS EXERCISE (LIKE A BRISK WALK)?: 1 DAY

## 2024-04-16 ASSESSMENT — ASTHMA QUESTIONNAIRES
ACT_TOTALSCORE: 24
QUESTION_5 LAST FOUR WEEKS HOW WOULD YOU RATE YOUR ASTHMA CONTROL: WELL CONTROLLED
QUESTION_4 LAST FOUR WEEKS HOW OFTEN HAVE YOU USED YOUR RESCUE INHALER OR NEBULIZER MEDICATION (SUCH AS ALBUTEROL): NOT AT ALL
QUESTION_3 LAST FOUR WEEKS HOW OFTEN DID YOUR ASTHMA SYMPTOMS (WHEEZING, COUGHING, SHORTNESS OF BREATH, CHEST TIGHTNESS OR PAIN) WAKE YOU UP AT NIGHT OR EARLIER THAN USUAL IN THE MORNING: NOT AT ALL
ACT_TOTALSCORE: 24
QUESTION_1 LAST FOUR WEEKS HOW MUCH OF THE TIME DID YOUR ASTHMA KEEP YOU FROM GETTING AS MUCH DONE AT WORK, SCHOOL OR AT HOME: NONE OF THE TIME
QUESTION_2 LAST FOUR WEEKS HOW OFTEN HAVE YOU HAD SHORTNESS OF BREATH: NOT AT ALL

## 2024-04-16 ASSESSMENT — SOCIAL DETERMINANTS OF HEALTH (SDOH): HOW OFTEN DO YOU GET TOGETHER WITH FRIENDS OR RELATIVES?: TWICE A WEEK

## 2024-04-16 NOTE — LETTER
April 18, 2024    Ceferino K Willa  [address] [facility] [address]          Dear ,    We are writing to inform you of your test results.    HSV 1 positive as expected.  HSV-2 negative.  Labs are otherwise normal.       Resulted Orders   Lipid panel reflex to direct LDL Fasting   Result Value Ref Range    Cholesterol 110 <200 mg/dL    Triglycerides 51 <150 mg/dL    Direct Measure HDL 56 >=40 mg/dL    LDL Cholesterol Calculated 44 <=100 mg/dL    Non HDL Cholesterol 54 <130 mg/dL    Patient Fasting > 8hrs? Yes     Narrative    Cholesterol  Desirable:  <200 mg/dL    Triglycerides  Normal:  Less than 150 mg/dL  Borderline High:  150-199 mg/dL  High:  200-499 mg/dL  Very High:  Greater than or equal to 500 mg/dL    Direct Measure HDL  Female:  Greater than or equal to 50 mg/dL   Male:  Greater than or equal to 40 mg/dL    LDL Cholesterol  Desirable:  <100mg/dL  Above Desirable:  100-129 mg/dL   Borderline High:  130-159 mg/dL   High:  160-189 mg/dL   Very High:  >= 190 mg/dL    Non HDL Cholesterol  Desirable:  130 mg/dL  Above Desirable:  130-159 mg/dL  Borderline High:  160-189 mg/dL  High:  190-219 mg/dL  Very High:  Greater than or equal to 220 mg/dL   Comprehensive metabolic panel   Result Value Ref Range    Sodium 142 135 - 145 mmol/L      Comment:      Reference intervals for this test were updated on 09/26/2023 to more accurately reflect our healthy population. There may be differences in the flagging of prior results with similar values performed with this method. Interpretation of those prior results can be made in the context of the updated reference intervals.     Potassium 4.6 3.4 - 5.3 mmol/L    Carbon Dioxide (CO2) 25 22 - 29 mmol/L    Anion Gap 9 7 - 15 mmol/L    Urea Nitrogen 13.6 6.0 - 20.0 mg/dL    Creatinine 0.84 0.67 - 1.17 mg/dL    GFR Estimate >90 >60 mL/min/1.73m2    Calcium 9.4 8.6 - 10.0 mg/dL    Chloride 108 (H) 98 - 107 mmol/L    Glucose 97 70 - 99 mg/dL    Alkaline Phosphatase  64 40 - 150 U/L      Comment:      Reference intervals for this test were updated on 11/14/2023 to more accurately reflect our healthy population. There may be differences in the flagging of prior results with similar values performed with this method. Interpretation of those prior results can be made in the context of the updated reference intervals.    AST 22 0 - 45 U/L      Comment:      Reference intervals for this test were updated on 6/12/2023 to more accurately reflect our healthy population. There may be differences in the flagging of prior results with similar values performed with this method. Interpretation of those prior results can be made in the context of the updated reference intervals.    ALT 23 0 - 70 U/L      Comment:      Reference intervals for this test were updated on 6/12/2023 to more accurately reflect our healthy population. There may be differences in the flagging of prior results with similar values performed with this method. Interpretation of those prior results can be made in the context of the updated reference intervals.      Protein Total 6.6 6.4 - 8.3 g/dL    Albumin 4.5 3.5 - 5.2 g/dL    Bilirubin Total 0.7 <=1.2 mg/dL   Hemoglobin A1c   Result Value Ref Range    Hemoglobin A1C 5.4 0.0 - 5.6 %      Comment:      Normal <5.7%   Prediabetes 5.7-6.4%    Diabetes 6.5% or higher     Note: Adopted from ADA consensus guidelines.   Prostate Specific Antigen Screen   Result Value Ref Range    Prostate Specific Antigen Screen 0.67 0.00 - 3.50 ng/mL    Narrative    This result is obtained using the Roche Elecsys total PSA method on the shon e801 immunoassay analyzer. Results obtained with different assay methods or kits cannot be used interchangeably.   Herpes Simplex Virus 1 and 2 IgG   Result Value Ref Range    HSV Type 1 IgG Instrument Value 42.00 (H) <0.90 Index    Herpes Simplex Virus Type 1 IgG Antibody Positive.  IgG antibody to HSV-1 detected. (A) No HSV-1 IgG antibodies detected     HSV Type 2 IgG Instrument Value 0.11 <0.90 Index    Herpes Simplex Virus Type 2 IgG Antibody No HSV-2 IgG antibodies detected. No HSV-2 IgG antibodies detected       If you have any questions or concerns, please call the clinic at the number listed above.       Sincerely,      Arturo Coffey MD

## 2024-04-16 NOTE — PROGRESS NOTES
Preventive Care Visit  [facility] JAYDEN Cagle MD, Family Medicine  Apr 16, 2024      Assessment & Plan       ICD-10-CM    1. Well adult exam  Z00.00 Comprehensive metabolic panel     Hemoglobin A1c     Comprehensive metabolic panel     Hemoglobin A1c      2. Mild persistent asthma without complication  J45.30 fluticasone (FLOVENT DISKUS) 250 MCG/ACT inhaler      3. Hesitancy of micturition  R39.11 tamsulosin (FLOMAX) 0.4 MG capsule      4. Hyperlipidemia LDL goal <70  E78.5 Lipid panel reflex to direct LDL Fasting     Lipid panel reflex to direct LDL Fasting      5. Cerebrovascular accident (CVA), unspecified mechanism (H)  I63.9 atorvastatin (LIPITOR) 40 MG tablet     clopidogrel (PLAVIX) 75 MG tablet      6. Screening for prostate cancer  Z12.5 Prostate Specific Antigen Screen     Prostate Specific Antigen Screen      7. Erectile dysfunction, unspecified erectile dysfunction type  N52.9 sildenafil (VIAGRA) 50 MG tablet      8. Impacted cerumen of left ear  H61.22 REMOVE IMPACTED CERUMEN     Adult ENT  Referral      9. STD exposure  Z20.2 Herpes Simplex Virus 1 and 2 IgG     Herpes Simplex Virus 1 and 2 IgG        Unable to extrude wax with currette or flush.    Patient has been advised of split billing requirements and indicates understanding: Yes  Review of external notes as documented elsewhere in note        Counseling  Appropriate preventive services were discussed with this patient, including applicable screening as appropriate for fall prevention, nutrition, physical activity, Tobacco-use cessation, weight loss and cognition.  Checklist reviewing preventive services available has been given to the patient.      There are no Patient Instructions on file for this visit.    Forrest De La Vega is a 58 year old, presenting for the following:  Physical        4/16/2024     7:42 AM   Additional Questions   Roomed by Xochitl   Accompanied by none         4/16/2024     7:42 AM    Patient Reported Additional Medications   Patient reports taking the following new medications none        Via the Health Maintenance questionnaire, the patient has reported the following services have been completed , this information has been sent to the abstraction team.  Health Care Directive  Patient does not have a Health Care Directive or Living Will: Discussed advance care planning with patient; however, patient declined at this time.    HPI      Wt Readings from Last 4 Encounters:   04/16/24 92.1 kg (203 lb)   02/23/24 94.4 kg (208 lb 3.2 oz)   04/14/23 89.3 kg (196 lb 12.8 oz)   12/23/22 87.1 kg (192 lb)     Ear wax left ear    ED  Sildenafil refill    Possible genital herpes exposure  Asymptomatic  Requests testing    BPH  Flomax has been helpful  Needs refill    History of CVA  Affects right lateral visual field  Needs plavix refill    Asthma  Well controlled  Needs inhaler refill                4/16/2024   General Health   How would you rate your overall physical health? Good   Feel stress (tense, anxious, or unable to sleep) Not at all         4/16/2024   Nutrition   Three or more servings of calcium each day? (!) NO   Diet: Regular (no restrictions)   How many servings of fruit and vegetables per day? (!) 0-1   How many sweetened beverages each day? 0-1         4/16/2024   Exercise   Days per week of moderate/strenous exercise 1 day   Average minutes spent exercising at this level 0 min   (!) EXERCISE CONCERN      4/16/2024   Social Factors   Frequency of gathering with friends or relatives Twice a week   Worry food won't last until get money to buy more No   Food not last or not have enough money for food? No   Do you have housing?  Yes   Are you worried about losing your housing? No   Lack of transportation? No   Unable to get utilities (heat,electricity)? No         4/16/2024   Fall Risk   Fallen 2 or more times in the past year? No   Trouble with walking or balance? No          4/16/2024    Dental   Dentist two times every year? Yes         4/16/2024   TB Screening   Were you born outside of the [place]? No         Today's PHQ-2 Score:       4/16/2024     7:35 AM   PHQ-2 ( 1999 Pfizer)   Q1: Little interest or pleasure in doing things 0   Q2: Feeling down, depressed or hopeless 0   PHQ-2 Score 0   Q1: Little interest or pleasure in doing things Not at all   Q2: Feeling down, depressed or hopeless Not at all   PHQ-2 Score 0           4/16/2024   Substance Use   Alcohol more than 3/day or more than 7/wk No   Do you use any other substances recreationally? No     Social History     Tobacco Use    Smoking status: Never     Passive exposure: Never    Smokeless tobacco: Never    Tobacco comments:     Lives in smoke free household   Vaping Use    Vaping status: Never Used   Substance Use Topics    Alcohol use: Yes     Comment: Social drinks occasionally.    Drug use: No           4/16/2024   STI Screening   New sexual partner(s) since last STI/HIV test? (!) YES    Last PSA:   PSA   Date Value Ref Range Status   12/28/2020 0.85 0 - 4 ug/L Final     Comment:     Assay Method:  Chemiluminescence using Siemens Vista analyzer     Prostate Specific Antigen Screen   Date Value Ref Range Status   04/16/2024 0.67 0.00 - 3.50 ng/mL Final   04/14/2023 0.91 0.00 - 4.00 ug/L Final     ASCVD Risk   The ASCVD Risk score (Brandin MAZA, et al., 2019) failed to calculate for the following reasons:    The patient has a prior MI or stroke diagnosis           Reviewed and updated as needed this visit by Provider                    BP Readings from Last 3 Encounters:   04/16/24 131/87   02/23/24 117/78   04/14/23 125/78    Wt Readings from Last 3 Encounters:   04/16/24 92.1 kg (203 lb)   02/23/24 94.4 kg (208 lb 3.2 oz)   04/14/23 89.3 kg (196 lb 12.8 oz)                      Review of Systems  Constitutional, HEENT, cardiovascular, pulmonary, gi and gu systems are negative, except as otherwise noted.     Objective    Exam  BP  "131/87   Pulse 75   Temp 97.1  F (36.2  C) (Temporal)   Resp 16   Ht 1.836 m (6' 0.28\")   Wt 92.1 kg (203 lb)   SpO2 98%   BMI 27.32 kg/m     Estimated body mass index is 27.32 kg/m  as calculated from the following:    Height as of this encounter: 1.836 m (6' 0.28\").    Weight as of this encounter: 92.1 kg (203 lb).    Physical Exam  Vitals reviewed.   Constitutional:       Appearance: Normal appearance. He is not ill-appearing.   HENT:      Head: Normocephalic.      Right Ear: Tympanic membrane and ear canal normal.      Left Ear: Tympanic membrane and ear canal normal. There is impacted cerumen.      Nose: Nose normal.   Eyes:      Extraocular Movements: Extraocular movements intact.   Cardiovascular:      Rate and Rhythm: Normal rate and regular rhythm.      Heart sounds: Normal heart sounds. No murmur heard.  Pulmonary:      Effort: Pulmonary effort is normal. No respiratory distress.      Breath sounds: Normal breath sounds. No wheezing or rales.   Abdominal:      Palpations: Abdomen is soft.   Musculoskeletal:         General: Normal range of motion.      Cervical back: Normal range of motion and neck supple.   Skin:     General: Skin is warm and dry.      Findings: No lesion.   Neurological:      Mental Status: He is alert and oriented to person, place, and time.   Psychiatric:         Mood and Affect: Mood normal.         Behavior: Behavior normal.         Thought Content: Thought content normal.         Judgment: Judgment normal.               Signed Electronically by: Arturo Cagle MD    "

## 2024-04-16 NOTE — COMMUNITY RESOURCES LIST (ENGLISH)
April 16, 2024           YOUR PERSONALIZED LIST OF SERVICES & PROGRAMS           & RECREATION    [place] - Sports clubs and recreational activities - [facility]  [address] [place] [address] (Distance: 6.6 miles)  Language: English  Fee: Self pay, Sliding scale      of [place] - Sports clubs and recreational activities  [address] [place] [address] (Distance: 5.2 miles)  Phone: [phone]  Website: [email or web address]  Language: English  Fee: Self pay      [employer]ASE[employer]ALL AND SOFTBALL  Website: http://www.[employer] Technologiesleague.org    Classes/Groups      of [facility] - Group fitness classes - [facility]  [address] [place] [address] (Distance: 6.6 miles)  Language: English  Fee: Free, Self pay, Sliding scale      [facility] - Personal training  [address] [place] [address] (Distance: 14.4 miles)  Phone: [phone]  Website: https://www.CheapFlightsFinder/  Language: English, Bruneian  Fee: Sliding scale, Self pay, Insurance      [facility] - Adult Enrichment  Phone: [phone]  Website: [email or web address]-seniors/adult-enrichment/  Language: English  Hours: Mon 7:30 AM - 4:00 PM Tue 7:30 AM - 4:00 PM Wed 7:30 AM - 4:00 PM Thu 7:30 AM - 4:00 PM Fri 7:30 AM - 4:00 PM               IMPORTANT NUMBERS & WEBSITES        Emergency Services  911  .   United Way  211 http://211unitedway.org  .   Poison Control  (994) 217-9130 http://[place]poison.org http://wis[place]poison.org  .     Suicide and Crisis Lifeline  988 http://988lifeline.org  .   Childhelp National Child Abuse Hotline  709.903.9378 http://Childhelphotline.org   .   National Sexual Assault Hotline  (429) 544-4783 (HOPE) http://Rainn.org   .     National Runaway Safeline  (374) 873-2580 (RUNAWAY) http://1800runaway.org  .   Pregnancy & Postpartum  Support  Call/text 070-597-7912  [place]: http://ppsupportmn.org  [place]: http://SplitSecnd.com/wi  .   Substance Abuse National Helpline ([facility])  923-980-HELP (9352) http://Findtreatment.gov   .                DISCLAIMER: These resources have been generated via the [employer] Platform. [employer] does not endorse any service providers mentioned in this resource list. [employer] does not guarantee that the services mentioned in this resource list will be available to you or will improve your health or wellness.    [facility], [place]

## 2024-04-17 LAB
ALBUMIN SERPL BCG-MCNC: 4.5 G/DL (ref 3.5–5.2)
ALP SERPL-CCNC: 64 U/L (ref 40–150)
ALT SERPL W P-5'-P-CCNC: 23 U/L (ref 0–70)
ANION GAP SERPL CALCULATED.3IONS-SCNC: 9 MMOL/L (ref 7–15)
AST SERPL W P-5'-P-CCNC: 22 U/L (ref 0–45)
BILIRUB SERPL-MCNC: 0.7 MG/DL
BUN SERPL-MCNC: 13.6 MG/DL (ref 6–20)
CALCIUM SERPL-MCNC: 9.4 MG/DL (ref 8.6–10)
CHLORIDE SERPL-SCNC: 108 MMOL/L (ref 98–107)
CHOLEST SERPL-MCNC: 110 MG/DL
CREAT SERPL-MCNC: 0.84 MG/DL (ref 0.67–1.17)
DEPRECATED HCO3 PLAS-SCNC: 25 MMOL/L (ref 22–29)
EGFRCR SERPLBLD CKD-EPI 2021: >90 ML/MIN/1.73M2
FASTING STATUS PATIENT QL REPORTED: YES
GLUCOSE SERPL-MCNC: 97 MG/DL (ref 70–99)
HDLC SERPL-MCNC: 56 MG/DL
HSV1 IGG SERPL QL IA: 42 INDEX
HSV1 IGG SERPL QL IA: ABNORMAL
HSV2 IGG SERPL QL IA: 0.11 INDEX
HSV2 IGG SERPL QL IA: ABNORMAL
LDLC SERPL CALC-MCNC: 44 MG/DL
NONHDLC SERPL-MCNC: 54 MG/DL
POTASSIUM SERPL-SCNC: 4.6 MMOL/L (ref 3.4–5.3)
PROT SERPL-MCNC: 6.6 G/DL (ref 6.4–8.3)
PSA SERPL DL<=0.01 NG/ML-MCNC: 0.67 NG/ML (ref 0–3.5)
SODIUM SERPL-SCNC: 142 MMOL/L (ref 135–145)
TRIGL SERPL-MCNC: 51 MG/DL

## 2024-04-19 NOTE — TELEPHONE ENCOUNTER
Called and left message for patient regarding the name of the referral.Sophia Robertson CMA       Left knee I&D 6/15/23    Requesting abx for dental appt       [facility] [address] [place] [address] - [address] [facility] - P [phone] - F [phone]

## 2024-04-22 ENCOUNTER — TRANSFERRED RECORDS (OUTPATIENT)
Dept: HEALTH INFORMATION MANAGEMENT | Facility: CLINIC | Age: 59
End: 2024-04-22
Payer: COMMERCIAL

## 2024-04-25 ENCOUNTER — TELEPHONE (OUTPATIENT)
Dept: FAMILY MEDICINE | Facility: CLINIC | Age: 59
End: 2024-04-25
Payer: COMMERCIAL

## 2024-04-25 NOTE — TELEPHONE ENCOUNTER
Please call patient to notify he's been referred to ENT for cerumen impaction left ear.     Arturo Cagle MD

## 2024-04-25 NOTE — TELEPHONE ENCOUNTER
Patient notified of referral    [facility] will call you to coordinate your care as prescribed by the provider. If you don t hear from a representative within 2 business days, please call [phone].     Jolene Perez RN  [facility]

## 2024-10-31 DIAGNOSIS — I63.9 CEREBROVASCULAR ACCIDENT (CVA), UNSPECIFIED MECHANISM (H): ICD-10-CM

## 2024-11-01 RX ORDER — CLOPIDOGREL BISULFATE 75 MG/1
75 TABLET ORAL DAILY
Qty: 90 TABLET | Refills: 1 | Status: SHIPPED | OUTPATIENT
Start: 2024-11-01

## 2025-04-17 ENCOUNTER — OFFICE VISIT (OUTPATIENT)
Dept: FAMILY MEDICINE | Facility: CLINIC | Age: 60
End: 2025-04-17
Payer: COMMERCIAL

## 2025-04-17 ENCOUNTER — TELEPHONE (OUTPATIENT)
Dept: FAMILY MEDICINE | Facility: CLINIC | Age: 60
End: 2025-04-17

## 2025-04-17 VITALS
SYSTOLIC BLOOD PRESSURE: 119 MMHG | RESPIRATION RATE: 16 BRPM | OXYGEN SATURATION: 96 % | BODY MASS INDEX: 26.93 KG/M2 | WEIGHT: 198.8 LBS | TEMPERATURE: 97.4 F | HEIGHT: 72 IN | HEART RATE: 65 BPM | DIASTOLIC BLOOD PRESSURE: 74 MMHG

## 2025-04-17 DIAGNOSIS — Z13.6 SCREENING FOR CARDIOVASCULAR CONDITION: ICD-10-CM

## 2025-04-17 DIAGNOSIS — Z12.5 SCREENING FOR PROSTATE CANCER: ICD-10-CM

## 2025-04-17 DIAGNOSIS — I63.9 CEREBROVASCULAR ACCIDENT (CVA), UNSPECIFIED MECHANISM (H): ICD-10-CM

## 2025-04-17 DIAGNOSIS — R53.82 CHRONIC FATIGUE: ICD-10-CM

## 2025-04-17 DIAGNOSIS — J45.30 MILD PERSISTENT ASTHMA WITHOUT COMPLICATION: Primary | ICD-10-CM

## 2025-04-17 DIAGNOSIS — N52.9 ERECTILE DYSFUNCTION, UNSPECIFIED ERECTILE DYSFUNCTION TYPE: ICD-10-CM

## 2025-04-17 DIAGNOSIS — Z13.1 SCREENING FOR DIABETES MELLITUS: ICD-10-CM

## 2025-04-17 DIAGNOSIS — Z87.74 S/P PATENT FORAMEN OVALE CLOSURE: ICD-10-CM

## 2025-04-17 DIAGNOSIS — R39.11 HESITANCY OF MICTURITION: ICD-10-CM

## 2025-04-17 DIAGNOSIS — J45.30 MILD PERSISTENT ASTHMA WITHOUT COMPLICATION: ICD-10-CM

## 2025-04-17 DIAGNOSIS — E78.5 HYPERLIPIDEMIA LDL GOAL <70: ICD-10-CM

## 2025-04-17 DIAGNOSIS — Z00.00 WELL ADULT EXAM: Primary | ICD-10-CM

## 2025-04-17 DIAGNOSIS — I63.9 OCCIPITAL STROKE (H): ICD-10-CM

## 2025-04-17 DIAGNOSIS — H53.461 RIGHT HOMONYMOUS HEMIANOPSIA: ICD-10-CM

## 2025-04-17 LAB
ALBUMIN SERPL BCG-MCNC: 4.6 G/DL (ref 3.5–5.2)
ALP SERPL-CCNC: 72 U/L (ref 40–150)
ALT SERPL W P-5'-P-CCNC: 24 U/L (ref 0–70)
ANION GAP SERPL CALCULATED.3IONS-SCNC: 10 MMOL/L (ref 7–15)
AST SERPL W P-5'-P-CCNC: 22 U/L (ref 0–45)
BILIRUB SERPL-MCNC: 0.8 MG/DL
BUN SERPL-MCNC: 13.3 MG/DL (ref 8–23)
CALCIUM SERPL-MCNC: 9.6 MG/DL (ref 8.8–10.4)
CHLORIDE SERPL-SCNC: 107 MMOL/L (ref 98–107)
CHOLEST SERPL-MCNC: 125 MG/DL
CREAT SERPL-MCNC: 0.86 MG/DL (ref 0.67–1.17)
EGFRCR SERPLBLD CKD-EPI 2021: >90 ML/MIN/1.73M2
EST. AVERAGE GLUCOSE BLD GHB EST-MCNC: 108 MG/DL
FASTING STATUS PATIENT QL REPORTED: YES
FASTING STATUS PATIENT QL REPORTED: YES
GLUCOSE SERPL-MCNC: 98 MG/DL (ref 70–99)
HBA1C MFR BLD: 5.4 % (ref 0–5.6)
HCO3 SERPL-SCNC: 25 MMOL/L (ref 22–29)
HDLC SERPL-MCNC: 63 MG/DL
LDLC SERPL CALC-MCNC: 53 MG/DL
NONHDLC SERPL-MCNC: 62 MG/DL
POTASSIUM SERPL-SCNC: 4.6 MMOL/L (ref 3.4–5.3)
PROT SERPL-MCNC: 6.9 G/DL (ref 6.4–8.3)
PSA SERPL DL<=0.01 NG/ML-MCNC: 0.88 NG/ML (ref 0–3.5)
SHBG SERPL-SCNC: 55 NMOL/L (ref 11–80)
SODIUM SERPL-SCNC: 142 MMOL/L (ref 135–145)
TRIGL SERPL-MCNC: 46 MG/DL

## 2025-04-17 RX ORDER — SILDENAFIL 50 MG/1
50 TABLET, FILM COATED ORAL DAILY PRN
Qty: 30 TABLET | Refills: 3 | Status: SHIPPED | OUTPATIENT
Start: 2025-04-17

## 2025-04-17 RX ORDER — TAMSULOSIN HYDROCHLORIDE 0.4 MG/1
0.8 CAPSULE ORAL EVERY EVENING
Qty: 180 CAPSULE | Refills: 3 | Status: SHIPPED | OUTPATIENT
Start: 2025-04-17

## 2025-04-17 RX ORDER — ATORVASTATIN CALCIUM 20 MG/1
20 TABLET, FILM COATED ORAL DAILY
Qty: 90 TABLET | Refills: 1 | Status: SHIPPED | OUTPATIENT
Start: 2025-04-17

## 2025-04-17 SDOH — HEALTH STABILITY: PHYSICAL HEALTH: ON AVERAGE, HOW MANY MINUTES DO YOU ENGAGE IN EXERCISE AT THIS LEVEL?: 10 MIN

## 2025-04-17 SDOH — HEALTH STABILITY: PHYSICAL HEALTH: ON AVERAGE, HOW MANY DAYS PER WEEK DO YOU ENGAGE IN MODERATE TO STRENUOUS EXERCISE (LIKE A BRISK WALK)?: 4 DAYS

## 2025-04-17 ASSESSMENT — ASTHMA QUESTIONNAIRES
QUESTION_3 LAST FOUR WEEKS HOW OFTEN DID YOUR ASTHMA SYMPTOMS (WHEEZING, COUGHING, SHORTNESS OF BREATH, CHEST TIGHTNESS OR PAIN) WAKE YOU UP AT NIGHT OR EARLIER THAN USUAL IN THE MORNING: ONCE OR TWICE
QUESTION_4 LAST FOUR WEEKS HOW OFTEN HAVE YOU USED YOUR RESCUE INHALER OR NEBULIZER MEDICATION (SUCH AS ALBUTEROL): ONCE A WEEK OR LESS
QUESTION_1 LAST FOUR WEEKS HOW MUCH OF THE TIME DID YOUR ASTHMA KEEP YOU FROM GETTING AS MUCH DONE AT WORK, SCHOOL OR AT HOME: NONE OF THE TIME
ACT_TOTALSCORE: 22
QUESTION_5 LAST FOUR WEEKS HOW WOULD YOU RATE YOUR ASTHMA CONTROL: WELL CONTROLLED
QUESTION_2 LAST FOUR WEEKS HOW OFTEN HAVE YOU HAD SHORTNESS OF BREATH: NOT AT ALL

## 2025-04-17 ASSESSMENT — SOCIAL DETERMINANTS OF HEALTH (SDOH): HOW OFTEN DO YOU GET TOGETHER WITH FRIENDS OR RELATIVES?: TWICE A WEEK

## 2025-04-17 NOTE — PROGRESS NOTES
"Preventive Care Visit  [facility] JAYDEN Cagle MD, Family Medicine  Apr 17, 2025      Assessment & Plan       ICD-10-CM    1. Well adult exam  Z00.00 Comprehensive metabolic panel     Comprehensive metabolic panel      2. Cerebrovascular accident (CVA), unspecified mechanism (H)  I63.9 atorvastatin (LIPITOR) 20 MG tablet     Adult Cardiology Eval  Referral     Hemoglobin A1c     Hemoglobin A1c      3. Mild persistent asthma without complication  J45.30 fluticasone (FLOVENT DISKUS) 250 MCG/ACT inhaler      4. Erectile dysfunction, unspecified erectile dysfunction type  N52.9 sildenafil (VIAGRA) 50 MG tablet     Testosterone Free and Total     Testosterone Free and Total      5. Hesitancy of micturition  R39.11 tamsulosin (FLOMAX) 0.4 MG capsule      6. Screening for cardiovascular condition  Z13.6       7. Hyperlipidemia LDL goal <70  E78.5 Lipid panel reflex to direct LDL Fasting     Lipid panel reflex to direct LDL Fasting      8. Screening for diabetes mellitus  Z13.1       9. Right homonymous hemianopsia  H53.461 Adult Neurology  Referral      10. Occipital stroke (H)  I63.9 Adult Neurology  Referral      11. S/P patent foramen ovale closure  Z87.74 Adult Cardiology Eval  Referral      12. Screening for prostate cancer  Z12.5 Prostate Specific Antigen Screen     Prostate Specific Antigen Screen      13. Chronic fatigue  R53.82 Testosterone Free and Total     Testosterone Free and Total        Lipitor lowered to 20mg today as LDL was 35 checked via workplace lab  Will recheck today    Patient has been advised of split billing requirements and indicates understanding: Yes        BMI  Estimated body mass index is 26.81 kg/m  as calculated from the following:    Height as of this encounter: 1.834 m (6' 0.21\").    Weight as of this encounter: 90.2 kg (198 lb 12.8 oz).   Weight management plan: Discussed healthy diet and exercise " guidelines    Counseling  Appropriate preventive services were addressed with this patient via screening, questionnaire, or discussion as appropriate for fall prevention, nutrition, physical activity, Tobacco-use cessation, social engagement, weight loss and cognition.  Checklist reviewing preventive services available has been given to the patient.  Reviewed patient's diet, addressing concerns and/or questions.           Forrest De La Vega is a 59 year old, presenting for the following:  Physical        4/17/2025     8:16 AM   Additional Questions   Roomed by Xochitl   Accompanied by none         4/17/2025     8:16 AM   Patient Reported Additional Medications   Patient reports taking the following new medications none          HPI      Wt Readings from Last 4 Encounters:   04/17/25 90.2 kg (198 lb 12.8 oz)   04/16/24 92.1 kg (203 lb)   02/23/24 94.4 kg (208 lb 3.2 oz)   04/14/23 89.3 kg (196 lb 12.8 oz)     BP Readings from Last 6 Encounters:   04/17/25 119/74   04/16/24 131/87   02/23/24 117/78   04/14/23 125/78   12/23/22 110/68   11/11/22 124/75     History of stroke associated with PFO s/p repair Feb 2024  Cardiology follow-up recommended October 2024, which he wasn't able to schedule  Still taking ASA 81 and plavix - unsure if/when to stop one of these as cardiology recommended stopping plavix 6 months after procedure, while neurology recommended stopping ASA 3 weeks after     Right homonymous hemianopia  Saw neuro optho  Recommended 6 month follow-up, wasn't able to schedule this      Asthma  Needs refills    Frequent urination  History of bph  Followed by urology  Recommended proscar in addition to flomax if symptoms continue       Chronic fatigue  Requests testosterone check        Advance Care Planning    Advance care planning document is on file but is outdated.  Patient encouraged to update or provider to update POLST.        4/17/2025   General Health   How would you rate your overall physical health?  Good   Feel stress (tense, anxious, or unable to sleep) Only a little   (!) STRESS CONCERN      4/17/2025   Nutrition   Three or more servings of calcium each day? (!) NO   Diet: Regular (no restrictions)   How many servings of fruit and vegetables per day? (!) 2-3   How many sweetened beverages each day? 0-1         4/17/2025   Exercise   Days per week of moderate/strenous exercise 4 days   Average minutes spent exercising at this level 10 min         4/17/2025   Social Factors   Frequency of gathering with friends or relatives Twice a week   Worry food won't last until get money to buy more No   Food not last or not have enough money for food? No   Do you have housing? (Housing is defined as stable permanent housing and does not include staying ouside in a car, in a tent, in an abandoned building, in an overnight shelter, or couch-surfing.) Yes   Are you worried about losing your housing? No   Lack of transportation? No   Unable to get utilities (heat,electricity)? No         4/17/2025   Fall Risk   Fallen 2 or more times in the past year? No   Trouble with walking or balance? No          4/17/2025   Dental   Dentist two times every year? Yes         Today's PHQ-2 Score:       4/17/2025     7:49 AM   PHQ-2 ( 1999 [employer])   Q1: Little interest or pleasure in doing things 0   Q2: Feeling down, depressed or hopeless 0   PHQ-2 Score 0    Q1: Little interest or pleasure in doing things Not at all   Q2: Feeling down, depressed or hopeless Not at all   PHQ-2 Score 0       Patient-reported           4/17/2025   Substance Use   Alcohol more than 3/day or more than 7/wk No   Do you use any other substances recreationally? No     Social History     Tobacco Use    Smoking status: Never     Passive exposure: Never    Smokeless tobacco: Never    Tobacco comments:     Lives in smoke free household   Vaping Use    Vaping status: Never Used   Substance Use Topics    Alcohol use: Yes     Comment: Social drinks occasionally.    Drug  "use: No           4/17/2025   STI Screening   New sexual partner(s) since last STI/HIV test? No   Last PSA:   PSA   Date Value Ref Range Status   12/28/2020 0.85 0 - 4 ug/L Final     Comment:     Assay Method:  Chemiluminescence using Siemens Vista analyzer     Prostate Specific Antigen Screen   Date Value Ref Range Status   04/16/2024 0.67 0.00 - 3.50 ng/mL Final   04/14/2023 0.91 0.00 - 4.00 ug/L Final     ASCVD Risk   The ASCVD Risk score (Brandin MAZA, et al., 2019) failed to calculate for the following reasons:    Risk score cannot be calculated because patient has a medical history suggesting prior/existing ASCVD           Reviewed and updated as needed this visit by Provider                    BP Readings from Last 3 Encounters:   04/17/25 119/74   04/16/24 131/87   02/23/24 117/78    Wt Readings from Last 3 Encounters:   04/17/25 90.2 kg (198 lb 12.8 oz)   04/16/24 92.1 kg (203 lb)   02/23/24 94.4 kg (208 lb 3.2 oz)                      Review of Systems  Constitutional, HEENT, cardiovascular, pulmonary, gi and gu systems are negative, except as otherwise noted.     Objective    Exam  /74   Pulse 65   Temp 97.4  F (36.3  C) (Temporal)   Resp 16   Ht 1.834 m (6' 0.21\")   Wt 90.2 kg (198 lb 12.8 oz)   SpO2 96%   BMI 26.81 kg/m     Estimated body mass index is 26.81 kg/m  as calculated from the following:    Height as of this encounter: 1.834 m (6' 0.21\").    Weight as of this encounter: 90.2 kg (198 lb 12.8 oz).    Physical Exam  Vitals reviewed.   Constitutional:       Appearance: Normal appearance. He is not ill-appearing.   HENT:      Head: Normocephalic.      Right Ear: Tympanic membrane and ear canal normal.      Left Ear: Tympanic membrane and ear canal normal.      Nose: Nose normal.   Eyes:      Extraocular Movements: Extraocular movements intact.   Cardiovascular:      Rate and Rhythm: Normal rate and regular rhythm.      Heart sounds: Normal heart sounds. No murmur " heard.  Pulmonary:      Effort: Pulmonary effort is normal. No respiratory distress.      Breath sounds: Normal breath sounds. No wheezing or rales.   Abdominal:      Palpations: Abdomen is soft.   Musculoskeletal:         General: Normal range of motion.      Cervical back: Normal range of motion and neck supple.   Skin:     General: Skin is warm and dry.      Findings: No lesion.   Neurological:      Mental Status: He is alert and oriented to person, place, and time.   Psychiatric:         Mood and Affect: Mood normal.         Behavior: Behavior normal.         Thought Content: Thought content normal.         Judgment: Judgment normal.               Signed Electronically by: Arturo Cagle MD

## 2025-04-17 NOTE — TELEPHONE ENCOUNTER
Called to  notified patient that PA was denied and provider has sent in pulmicort instead.  Patient verbalized understanding.    Jolene Perez RN  [facility]

## 2025-04-17 NOTE — TELEPHONE ENCOUNTER
PRIOR AUTHORIZATION DENIED    Medication: FLUTICASONE PROPIONATE  MCG/ACT IN AERO  Insurance Company: [employer] - Phone [phone] Fax [phone]  Denial Date: 4/17/2025  Denial Reason(s): Must try/fail preferred meds with documentation to adverse reaction: Pulmicort and Asmanex          Appeal Information:       Patient Notified: No

## 2025-04-20 LAB
TESTOST FREE SERPL-MCNC: 10.76 NG/DL
TESTOST SERPL-MCNC: 683 NG/DL (ref 240–950)

## 2025-04-22 ENCOUNTER — TELEPHONE (OUTPATIENT)
Dept: CARDIOLOGY | Facility: CLINIC | Age: 60
End: 2025-04-22
Payer: COMMERCIAL

## 2025-04-22 DIAGNOSIS — I63.9 ISCHEMIC STROKE (H): Primary | ICD-10-CM

## 2025-04-22 DIAGNOSIS — E78.5 HYPERLIPIDEMIA LDL GOAL <70: ICD-10-CM

## 2025-04-22 DIAGNOSIS — Z87.74 HISTORY OF SURGICAL CLOSURE OF PATENT FORAMEN OVALE (PFO): ICD-10-CM

## 2025-04-22 NOTE — TELEPHONE ENCOUNTER
[facility] Call Center    Phone Message    May a detailed message be left on voicemail: yes     Reason for Call: Appointment Intake    Referring Provider Name: Arturo Mcknightsirena  Diagnosis and/or Symptoms: PFO closure done in 2024 at [facility] and would like to transfer care to us. Does patient need imaging prior to this appt? Please reach out to schedule - patient prefers [place] location.     Action Taken: Message routed to:  [facility] ([facility] Cardio    Travel Screening: Not Applicable     Date of Service:

## 2025-04-23 NOTE — TELEPHONE ENCOUNTER
And Zio patch 2 weeks.   Echo with bubbles.     You  Can, MD Abbi4 hours ago (11:57 AM)     CK  This patient is establishing care in June. Referral for cerebrovascular accident & S/P patent foramen ovale closure. Would you like any imaging done prior?     Echo with bubble study placed and Zio for 2 weeks to be placed. Attempted to call patient to discuss imaging and zio monitor. Unable tor each patient.     Shirin Guardado RN, BSN  Cardiology RN Care Coordinator   [place]   Phone: [phone]  Fax: [phone] ([facility] [phone] ([place])

## 2025-04-25 NOTE — TELEPHONE ENCOUNTER
Attempted to call patient. Left message for patient.    Shirin Guardado, RN, BSN  Cardiology RN Care Coordinator   [place]   Phone: [phone]  Fax: [phone] ([place]) [phone] ([place])

## 2025-04-28 NOTE — TELEPHONE ENCOUNTER
Called and spoke with patient. Patient agreeable to echocardiogram but wished to hold off on heart monitor. Echocardiogram with Bubble study placed. Number given for patient to schedule at his convenience.     Shirin Guardado, RN, BSN  Cardiology RN Care Coordinator   [place]   Phone: [phone]  Fax: [phone] ([place]) [phone] ([place])

## 2025-04-28 NOTE — TELEPHONE ENCOUNTER
Ok good     You  You; Can, MD Abbi38 minutes ago (10:36 AM)     CK  Fyi patient wanted to hold off on heart monitor but was agreeable to echo so this will get completed prior. Thanks     Provider updated.    Shirin Guardado, RN, BSN  Cardiology RN Care Coordinator   [place]   Phone: [phone]  Fax: [phone] ([place]) [phone] ([place])

## 2025-05-02 DIAGNOSIS — I63.9 CEREBROVASCULAR ACCIDENT (CVA), UNSPECIFIED MECHANISM (H): ICD-10-CM

## 2025-05-06 RX ORDER — CLOPIDOGREL BISULFATE 75 MG/1
75 TABLET ORAL DAILY
Qty: 90 TABLET | Refills: 1 | Status: SHIPPED | OUTPATIENT
Start: 2025-05-06

## 2025-06-11 ENCOUNTER — RESULTS FOLLOW-UP (OUTPATIENT)
Dept: CARDIOLOGY | Facility: CLINIC | Age: 60
End: 2025-06-11

## 2025-06-11 ENCOUNTER — HOSPITAL ENCOUNTER (OUTPATIENT)
Dept: CARDIOLOGY | Facility: CLINIC | Age: 60
Discharge: HOME | End: 2025-06-11
Attending: INTERNAL MEDICINE
Payer: COMMERCIAL

## 2025-06-11 DIAGNOSIS — E78.5 HYPERLIPIDEMIA LDL GOAL <70: ICD-10-CM

## 2025-06-11 DIAGNOSIS — I63.9 ISCHEMIC STROKE (H): ICD-10-CM

## 2025-06-11 DIAGNOSIS — Z87.74 HISTORY OF SURGICAL CLOSURE OF PATENT FORAMEN OVALE (PFO): ICD-10-CM

## 2025-06-11 LAB — LVEF ECHO: NORMAL

## 2025-06-11 PROCEDURE — 93306 TTE W/DOPPLER COMPLETE: CPT | Mod: 26 | Performed by: INTERNAL MEDICINE

## 2025-06-11 PROCEDURE — 93306 TTE W/DOPPLER COMPLETE: CPT

## 2025-06-25 ENCOUNTER — OFFICE VISIT (OUTPATIENT)
Dept: CARDIOLOGY | Facility: CLINIC | Age: 60
End: 2025-06-25
Payer: COMMERCIAL

## 2025-06-25 VITALS
OXYGEN SATURATION: 97 % | BODY MASS INDEX: 26.43 KG/M2 | SYSTOLIC BLOOD PRESSURE: 113 MMHG | WEIGHT: 196 LBS | DIASTOLIC BLOOD PRESSURE: 75 MMHG | HEART RATE: 60 BPM

## 2025-06-25 DIAGNOSIS — Z86.73 HISTORY OF CVA (CEREBROVASCULAR ACCIDENT): ICD-10-CM

## 2025-06-25 DIAGNOSIS — Z87.74 HISTORY OF SURGICAL CLOSURE OF PATENT FORAMEN OVALE (PFO): Primary | ICD-10-CM
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
## 2025-06-25 NOTE — PATIENT INSTRUCTIONS
Thank you for coming to the [facility]; please note the following instructions:    1. Dr. GOMES recommends to follow up in 2 years with an echo prior.  The cardiology team will contact you to schedule when the time gets closer.          If you have any questions regarding your visit please contact your care team:     Cardiology  Telephone Number   Shaye MOSS., RN  Shirin ADAMSON, RN  Alba PRICE, RN  Alicia JACOB, RMA  Irma MILLER, DORIEA  Celina GONZALEZ, [place]  Lorene ADAMSON, KARINA Rendon., [phone] (option 1)   For scheduling appts:     [phone] (select option 1)       For the Device Clinic (Pacemakers and ICD's)  RN's :  Alexa Alicia   During business hours: [phone]    *After business hours:  [phone] (select option 4)      VIRTUAL VISITS: If you have had a virtual visit and have testing needing to be scheduled, please call [phone]. Otherwise please allow 24-48 hours for staff to reach out to assist in scheduling.     Normal test result notifications will be released via [employer] or mailed within 7 business days.  All other test results, will be communicated via telephone once reviewed by your cardiologist.    If you need a medication refill please contact your pharmacy.  Please allow 3 business days for your refill to be completed.    As always, thank you for trusting us with your health care needs!

## 2025-06-25 NOTE — NURSING NOTE
"Chief Complaint   Patient presents with    New Patient     New general cardiology for Cerebrovascular accident (CVA), unspecified mechanism, S/P patent foramen ovale closure       Initial /75 (BP Location: Left arm, Patient Position: Chair, Cuff Size: Adult Regular)   Pulse 60   Wt 88.9 kg (196 lb)   SpO2 97%   BMI 26.43 kg/m   Estimated body mass index is 26.43 kg/m  as calculated from the following:    Height as of 4/17/25: 1.834 m (6' 0.21\").    Weight as of this encounter: 88.9 kg (196 lb)..  BP completed using cuff size: regular    LEONIDAS Peters    "
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
## 2025-07-07 DIAGNOSIS — J45.30 MILD PERSISTENT ASTHMA WITHOUT COMPLICATION: ICD-10-CM

## 2025-07-07 RX ORDER — FLUTICASONE PROPIONATE 250 UG/1
1 POWDER, METERED RESPIRATORY (INHALATION) EVERY 12 HOURS
Qty: 180 EACH | Refills: 0 | Status: SHIPPED | OUTPATIENT
Start: 2025-07-07

## 2025-07-08 ENCOUNTER — TELEPHONE (OUTPATIENT)
Dept: FAMILY MEDICINE | Facility: CLINIC | Age: 60
End: 2025-07-08
Payer: COMMERCIAL

## 2025-07-08 NOTE — TELEPHONE ENCOUNTER
Prior Authorization Retail Medication Request    Medication/Dose: Fluticasone Propionate, Inhal, (FLUTICASONE PROPIONATE DISKUS) 250 MCG/ACT AEPB  Diagnosis and ICD code (if different than what is on RX):  Mild persistent asthma without complication [J45.30]   New/renewal/insurance change PA/secondary ins. PA:  Previously Tried and Failed:    Rationale:      Insurance   Primary: [facility]  Insurance ID:  [id number]     Secondary (if applicable):  Insurance ID:      Pharmacy Information (if different than what is on RX)  Name:  [id number]  Phone:  [phone]  Fax:[phone]    Clinic Information  Preferred routing pool for dept communication: [facility]  Sophia Robertson CMA

## 2025-07-10 NOTE — TELEPHONE ENCOUNTER
Central Prior Authorization Team   Phone: [phone]    PA Initiation    Medication: Fluticasone Propionate, Inhal, (FLUTICASONE PROPIONATE DISKUS) 250 MCG/ACT AEPB  Insurance Company: [employer] Non-Specialty PA's - Phone [phone] Fax [phone]  Pharmacy Filling the Rx: [id number] - [place], [place] - [address] [place] [address] AT [address]  Filling Pharmacy Phone: [phone]  Filling Pharmacy Fax:    Start Date: 7/10/2025    [facility] KEY: [id number]

## 2025-07-10 NOTE — TELEPHONE ENCOUNTER
Prior Authorization Approval    Authorization Effective Date: 7/10/2025  Authorization Expiration Date: 7/10/2026  Medication: Fluticasone Propionate, Inhal, (FLUTICASONE PROPIONATE DISKUS) 250 MCG/ACT AEPB  Approved Dose/Quantity:    Reference #:     Insurance Company: [employer] Non-Specialty PA's - Phone [phone] Fax [phone]  Expected CoPay:       CoPay Card Available:      Foundation Assistance Needed:    Which Pharmacy is filling the prescription (Not needed for infusion/clinic administered): [id number] - [place], [place] - [address] [place] [address] AT [address]  Pharmacy Notified:  Yes  Patient Notified:  **Instructed pharmacy to notify patient when script is ready to /ship.**

## 2025-07-14 DIAGNOSIS — J45.30 MILD PERSISTENT ASTHMA WITHOUT COMPLICATION: ICD-10-CM

## 2025-07-14 NOTE — TELEPHONE ENCOUNTER
Patient calling to request a refill on his proair.  Pended the medication.    ILEANA Hernandez  [facility]

## 2025-07-15 RX ORDER — ALBUTEROL SULFATE 90 UG/1
2 INHALANT RESPIRATORY (INHALATION) EVERY 6 HOURS PRN
Qty: 6.7 G | Refills: 0 | Status: SHIPPED | OUTPATIENT
Start: 2025-07-15

## 2025-08-07 DIAGNOSIS — J45.30 MILD PERSISTENT ASTHMA WITHOUT COMPLICATION: ICD-10-CM

## 2025-08-07 RX ORDER — ALBUTEROL SULFATE 90 UG/1
2 INHALANT RESPIRATORY (INHALATION) EVERY 6 HOURS PRN
Qty: 18 G | Refills: 1 | Status: SHIPPED | OUTPATIENT
Start: 2025-08-07